# Patient Record
Sex: MALE | Race: WHITE | NOT HISPANIC OR LATINO | Employment: OTHER | URBAN - NONMETROPOLITAN AREA
[De-identification: names, ages, dates, MRNs, and addresses within clinical notes are randomized per-mention and may not be internally consistent; named-entity substitution may affect disease eponyms.]

---

## 2020-09-09 ENCOUNTER — CONSULT (OUTPATIENT)
Dept: CARDIOLOGY | Facility: CLINIC | Age: 55
End: 2020-09-09

## 2020-09-09 VITALS
TEMPERATURE: 97.8 F | SYSTOLIC BLOOD PRESSURE: 136 MMHG | HEART RATE: 86 BPM | OXYGEN SATURATION: 98 % | DIASTOLIC BLOOD PRESSURE: 88 MMHG | WEIGHT: 230 LBS | BODY MASS INDEX: 32.2 KG/M2 | HEIGHT: 71 IN

## 2020-09-09 DIAGNOSIS — Z79.4 TYPE 2 DIABETES MELLITUS WITHOUT COMPLICATION, WITH LONG-TERM CURRENT USE OF INSULIN (HCC): ICD-10-CM

## 2020-09-09 DIAGNOSIS — E11.9 TYPE 2 DIABETES MELLITUS WITHOUT COMPLICATION, WITH LONG-TERM CURRENT USE OF INSULIN (HCC): ICD-10-CM

## 2020-09-09 DIAGNOSIS — I10 ESSENTIAL HYPERTENSION: Primary | ICD-10-CM

## 2020-09-09 DIAGNOSIS — I25.119 CORONARY ARTERY DISEASE INVOLVING NATIVE CORONARY ARTERY OF NATIVE HEART WITH ANGINA PECTORIS (HCC): ICD-10-CM

## 2020-09-09 DIAGNOSIS — G47.33 OBSTRUCTIVE SLEEP APNEA: ICD-10-CM

## 2020-09-09 DIAGNOSIS — E78.5 HYPERLIPIDEMIA LDL GOAL <70: ICD-10-CM

## 2020-09-09 DIAGNOSIS — R09.89 BRUIT: ICD-10-CM

## 2020-09-09 DIAGNOSIS — I13.0 HYPERTENSIVE HEART AND CHRONIC KIDNEY DISEASE WITH HEART FAILURE AND STAGE 1 THROUGH STAGE 4 CHRONIC KIDNEY DISEASE, OR UNSPECIFIED CHRONIC KIDNEY DISEASE (HCC): ICD-10-CM

## 2020-09-09 PROBLEM — M06.9 RHEUMATOID ARTHRITIS (HCC): Status: ACTIVE | Noted: 2020-09-09

## 2020-09-09 PROBLEM — I20.9 ANGINA PECTORIS (HCC): Status: ACTIVE | Noted: 2020-09-09

## 2020-09-09 PROBLEM — J84.10 PULMONARY FIBROSIS: Status: ACTIVE | Noted: 2020-09-09

## 2020-09-09 PROCEDURE — 93000 ELECTROCARDIOGRAM COMPLETE: CPT | Performed by: INTERNAL MEDICINE

## 2020-09-09 PROCEDURE — 99204 OFFICE O/P NEW MOD 45 MIN: CPT | Performed by: INTERNAL MEDICINE

## 2020-09-09 RX ORDER — INSULIN DETEMIR 100 [IU]/ML
20 INJECTION, SOLUTION SUBCUTANEOUS DAILY
COMMUNITY
Start: 2020-08-25 | End: 2020-09-09 | Stop reason: SDUPTHER

## 2020-09-09 RX ORDER — FLUOXETINE HYDROCHLORIDE 20 MG/1
60 CAPSULE ORAL DAILY
COMMUNITY
Start: 2020-08-25 | End: 2022-10-26

## 2020-09-09 RX ORDER — GABAPENTIN 600 MG/1
600 TABLET ORAL AS NEEDED
COMMUNITY

## 2020-09-09 RX ORDER — HYDRALAZINE HYDROCHLORIDE 25 MG/1
25 TABLET, FILM COATED ORAL AS NEEDED
Start: 2020-09-09 | End: 2021-01-12

## 2020-09-09 RX ORDER — LISINOPRIL 40 MG/1
40 TABLET ORAL 2 TIMES DAILY
COMMUNITY
End: 2020-09-09 | Stop reason: SDUPTHER

## 2020-09-09 RX ORDER — HYDRALAZINE HYDROCHLORIDE 25 MG/1
25 TABLET, FILM COATED ORAL AS NEEDED
COMMUNITY
End: 2020-09-09 | Stop reason: SDUPTHER

## 2020-09-09 RX ORDER — FINASTERIDE 5 MG/1
5 TABLET, FILM COATED ORAL DAILY
COMMUNITY
Start: 2020-07-22

## 2020-09-09 RX ORDER — LORAZEPAM 0.5 MG/1
0.5 TABLET ORAL 2 TIMES DAILY
COMMUNITY

## 2020-09-09 RX ORDER — AMLODIPINE BESYLATE 10 MG/1
10 TABLET ORAL DAILY
Qty: 90 TABLET | Refills: 3 | Status: SHIPPED | OUTPATIENT
Start: 2020-09-09 | End: 2020-09-23

## 2020-09-09 RX ORDER — CLONIDINE HYDROCHLORIDE 0.2 MG/1
0.2 TABLET ORAL 3 TIMES DAILY
Start: 2020-09-09 | End: 2020-09-23

## 2020-09-09 RX ORDER — POTASSIUM CITRATE 5 MEQ/1
TABLET, EXTENDED RELEASE ORAL
COMMUNITY
End: 2022-10-26

## 2020-09-09 RX ORDER — PREDNISONE 20 MG/1
20 TABLET ORAL AS NEEDED
COMMUNITY

## 2020-09-09 RX ORDER — SPIRONOLACTONE 50 MG/1
50 TABLET, FILM COATED ORAL DAILY
Qty: 90 TABLET | Refills: 1 | Status: SHIPPED | OUTPATIENT
Start: 2020-09-09 | End: 2020-09-23 | Stop reason: SDUPTHER

## 2020-09-09 RX ORDER — LISINOPRIL 40 MG/1
40 TABLET ORAL DAILY
Qty: 90 TABLET | Refills: 1 | Status: SHIPPED | OUTPATIENT
Start: 2020-09-09 | End: 2020-09-23

## 2020-09-09 RX ORDER — CLONIDINE HYDROCHLORIDE 0.2 MG/1
0.2 TABLET ORAL 3 TIMES DAILY
COMMUNITY
End: 2020-09-09 | Stop reason: SDUPTHER

## 2020-09-09 RX ORDER — FENOFIBRATE 160 MG/1
160 TABLET ORAL DAILY
Start: 2020-09-09 | End: 2021-01-12 | Stop reason: SDUPTHER

## 2020-09-09 RX ORDER — INSULIN DETEMIR 100 [IU]/ML
20 INJECTION, SOLUTION SUBCUTANEOUS DAILY
Start: 2020-09-09 | End: 2020-09-23 | Stop reason: SDUPTHER

## 2020-09-09 RX ORDER — SULFAMETHOXAZOLE AND TRIMETHOPRIM 800; 160 MG/1; MG/1
TABLET ORAL DAILY
COMMUNITY
Start: 2020-07-27 | End: 2020-09-23

## 2020-09-09 RX ORDER — SPIRONOLACTONE 25 MG/1
25 TABLET ORAL DAILY
COMMUNITY
End: 2020-09-09 | Stop reason: SDUPTHER

## 2020-09-09 RX ORDER — ALBUTEROL SULFATE 2.5 MG/3ML
2.5 SOLUTION RESPIRATORY (INHALATION) EVERY 4 HOURS PRN
COMMUNITY

## 2020-09-09 RX ORDER — FENOFIBRATE 160 MG/1
160 TABLET ORAL DAILY
COMMUNITY
Start: 2020-08-25 | End: 2020-09-09 | Stop reason: SDUPTHER

## 2020-09-09 RX ORDER — TRAMADOL HYDROCHLORIDE 50 MG/1
25 TABLET ORAL AS NEEDED
COMMUNITY

## 2020-09-09 NOTE — PROGRESS NOTES
[facility]  Cardiology Consultation Note     DATE: 2020  Requesting Provider: ARLINE Cronin  PCP: Terrell Maldonado MD    IDENTIFICATION: Bret Rodriguez is a 54 y.o. male who resides in [place], [place].    REASON FOR CONSULTATION: Uncontrolled hypertension         Dear Jelly,    Thank you for referring Bret Rodriguez to my office for evaluation of uncontrolled hypertension, coronary artery disease, and CHF.  The patient reports a history of severe hypertension dating back to his early adulthood.  He has been maintained on multiple medications and still cannot control his blood pressure.  The patient also has a history of pulmonary fibrosis and rheumatoid arthritis.  He has been told his lung capacity is 50% and he believes that this contributes to some of his issues with high blood pressure.  He does have prednisone prescribed for his pulmonary fibrosis, but is reluctant to take it as he knows that this will worsen his blood pressure.     The patient has been followed over the last several years by Dr. Beebe at [facility].  We do not have these records.  However, the patient states that he has had several heart catheterizations with the most recent catheterization in approximately .  At that time he was told he had moderate disease.  He does not recall any evaluation for renal artery stenosis.    The patient reports an episode of chest pain approximately 2 to 3 weeks ago which was not related to exertion.  He recalls the pain being severe and doubled him over.  He reports no radiation to the jaw, neck, or arm.  He did remember being diaphoretic, however.  The pain resolved spontaneously after several minutes and has not returned.    The patient experiences shortness of breath almost constantly due to pulmonary fibrosis.    The patient has family history of premature coronary artery disease.  His brother  of a heart attack in his mid 40s.    Past Medical  History, Past Surgical History, Family history, Social History, and Medications were all reviewed with the patient today and updated as necessary.       Current Outpatient Medications:   •  albuterol (PROVENTIL) (2.5 MG/3ML) 0.083% nebulizer solution, Take 2.5 mg by nebulization Every 4 (Four) Hours As Needed for Wheezing., Disp: , Rfl:   •  cloNIDine (CATAPRES) 0.2 MG tablet, Take 1 tablet by mouth 3 (Three) Times a Day., Disp: , Rfl:   •  fenofibrate 160 MG tablet, Take 160 mg by mouth Daily., Disp: , Rfl:   •  finasteride (PROSCAR) 5 MG tablet, Take 5 mg by mouth Daily., Disp: , Rfl:   •  FLUoxetine (PROzac) 20 MG capsule, 60 mg Daily., Disp: , Rfl:   •  gabapentin (NEURONTIN) 600 MG tablet, Take 600 mg by mouth As Needed., Disp: , Rfl:   •  hydrALAZINE (APRESOLINE) 25 MG tablet, Take 1 tablet by mouth As Needed (SBP > 180 mmHg)., Disp: , Rfl:   •  LEVEMIR FLEXTOUCH 100 UNIT/ML injection, Inject 20 Units under the skin into the appropriate area as directed Daily., Disp: , Rfl:   •  lisinopril (PRINIVIL,ZESTRIL) 40 MG tablet, Take 1 tablet by mouth Daily., Disp: 90 tablet, Rfl: 1  •  LORazepam (ATIVAN) 0.5 MG tablet, Take 0.5 mg by mouth 2 (Two) Times a Day., Disp: , Rfl:   •  metFORMIN (GLUCOPHAGE) 1000 MG tablet, Take 1 tablet by mouth 2 (Two) Times a Day With Meals., Disp: , Rfl:   •  NAPROXEN PO, Take  by mouth As Needed., Disp: , Rfl:   •  potassium citrate (UROCIT-K) 5 MEQ (540 MG) CR tablet, Take  by mouth 3 (Three) Times a Day With Meals., Disp: , Rfl:   •  predniSONE (DELTASONE) 20 MG tablet, Take 20 mg by mouth As Needed., Disp: , Rfl:   •  spironolactone (ALDACTONE) 50 MG tablet, Take 1 tablet by mouth Daily., Disp: 90 tablet, Rfl: 1  •  sulfamethoxazole-trimethoprim (BACTRIM DS,SEPTRA DS) 800-160 MG per tablet, Daily., Disp: , Rfl:   •  traMADol (ULTRAM) 50 MG tablet, Take 25 mg by mouth As Needed for Moderate Pain ., Disp: , Rfl:   •  amLODIPine (NORVASC) 10 MG tablet, Take 1 tablet by mouth Daily.,  "Disp: 90 tablet, Rfl: 3  •  Empagliflozin (Jardiance) 10 MG tablet, Take 10 mg by mouth Daily., Disp: 90 tablet, Rfl: 1    Allergies   Allergen Reactions   • Atorvastatin Myalgia         Past Medical History:   Diagnosis Date   • Anxiety    • Arthritis    • Congestive heart failure (CHF) (CMS/HCC)    • Depression    • Easy bruising    • Heart failure (CMS/HCC)    • History of right heart catheterization    • History of stress test    • Hypertension    • Kidney stones    • Pulmonary fibrosis (CMS/HCC)        Past Surgical History:   Procedure Laterality Date   • GALLBLADDER SURGERY     • LUNG BIOPSY     • TONSILLECTOMY     • TONSILLECTOMY AND ADENOIDECTOMY         Family History   Problem Relation Age of Onset   • Hypertension Mother    • Cancer Mother    • Parkinsonism Father    • Coronary artery disease Father    • Hypertension Sister    • Emphysema Sister    • Heart attack Brother 46   • Diabetes Brother        Social History     Tobacco Use   • Smoking status: Never Smoker   • Smokeless tobacco: Never Used   Substance Use Topics   • Alcohol use: Never     Frequency: Never       Review of Systems   Constitution: Negative for malaise/fatigue.   Eyes: Negative for vision loss in left eye and vision loss in right eye.   Cardiovascular: Positive for chest pain, dyspnea on exertion and leg swelling. Negative for near-syncope, orthopnea, palpitations, paroxysmal nocturnal dyspnea and syncope.   Respiratory: Positive for shortness of breath.    Musculoskeletal: Negative for myalgias.   Neurological: Negative for brief paralysis, excessive daytime sleepiness, focal weakness, numbness, paresthesias and weakness.   All other systems reviewed and are negative.              /88 (BP Location: Right arm, Patient Position: Sitting)   Pulse 86   Temp 97.8 °F (36.6 °C)   Ht 180.3 cm (71\")   Wt 104 kg (230 lb)   SpO2 98%   BMI 32.08 kg/m²        Physical Exam   Constitutional: He is oriented to person, place, and time. " He appears well-developed and well-nourished.   HENT:   Head: Normocephalic and atraumatic.   Eyes: Conjunctivae are normal. No scleral icterus.   Neck: Normal range of motion. No JVD present. Carotid bruit is not present. No thyromegaly present.   Cardiovascular: Normal rate and regular rhythm. Exam reveals no gallop.   No murmur heard.  Pulses:       Carotid pulses are on the right side with bruit, and on the left side with bruit.  Pulmonary/Chest: Effort normal and breath sounds normal.   Abdominal: Soft. He exhibits no distension and no mass. There is no hepatosplenomegaly.   Musculoskeletal: He exhibits no edema.   Neurological: He is alert and oriented to person, place, and time.   Skin: Skin is warm and dry. No rash noted.   Psychiatric: He has a normal mood and affect. His behavior is normal.           ECG 12 Lead  Date/Time: 9/9/2020 9:28 AM  Performed by: Casey Milton IV, MD  Authorized by: Casey Milton IV, MD   Previous ECG: no previous ECG available  Rhythm: sinus rhythm  BPM: 72    Clinical impression: normal ECG          Labs (07/27/2020):  · Creat 1.37, BUN 15, K 3.9, AST 18, ALT 22  · WBC 5.3, RBC 4.62, HGB 14.3, HCT 42.6,   · T4, Free 1.09, TSH 1.68    (02/05/2020):  · Chol 190, Trig 607, HDL 34           Problem List Items Addressed This Visit        Cardiology Problems    Coronary artery disease involving native coronary artery of native heart with angina pectoris (CMS/[facility]) - Primary    Overview     · Cardiac catheterization by [facility] (2015):  Moderate disease.         Current Assessment & Plan     · Atypical anginal symptoms  · Multiple cardiovascular risk factors and history of moderate disease on previous catheterization  · Recommend pharmacologic nuclear stress test  · Continue low-dose aspirin  · Will discuss statin therapy at next visit         Relevant Medications    amLODIPine (NORVASC) 10 MG tablet    Other Relevant Orders    ECG 12 Lead    CBC (No Diff)     Stress Test With Myocardial Perfusion (1 Day)    Essential hypertension    Overview     Severe hypertension since early adulthood  • Target blood pressure <130/80 mmHg         Current Assessment & Plan     · Uncontrolled  · Evaluate for secondary causes of hypertension including plasma renin and aldosterone, renal artery duplex  · Reduce lisinopril dosing to 40 mg daily  · Increase spironolactone to 50 mg daily  · CMP in 2 weeks  · Continue to use hydralazine on an as-needed basis  · Consider doxazosin/terazosin if BP remains elevated after above changes         Relevant Medications    amLODIPine (NORVASC) 10 MG tablet    cloNIDine (CATAPRES) 0.2 MG tablet    hydrALAZINE (APRESOLINE) 25 MG tablet    lisinopril (PRINIVIL,ZESTRIL) 40 MG tablet    spironolactone (ALDACTONE) 50 MG tablet    Other Relevant Orders    Renin Activity & Aldosterone    Duplex Renal Artery - Bilateral Complete CAR    Adult Transthoracic Echo Complete W/ Cont if Necessary Per Protocol    Hyperlipidemia LDL goal <70    Overview     • High intensity statin therapy indicated given the presence of CAD  · Intolerant to atorvastatin due to myalgias         Current Assessment & Plan     · Will recommend rosuvastatin 10 mg daily at upcoming visit         Relevant Medications    fenofibrate 160 MG tablet    Other Relevant Orders    Lipid Panel    LDL Cholesterol, Direct    Comprehensive Metabolic Panel       Other    Type 2 diabetes mellitus (CMS/[facility])    Current Assessment & Plan     · ACE inhibitor and statin therapy indicated diabetic status  · Trial of Jardiance 10 mg daily for diabetes and CV risk reduction         Relevant Medications    LEVEMIR FLEXTOUCH 100 UNIT/ML injection    metFORMIN (GLUCOPHAGE) 1000 MG tablet    Empagliflozin (Jardiance) 10 MG tablet    Other Relevant Orders    Hemoglobin A1c    Obstructive sleep apnea    Current Assessment & Plan     · Continue CPAP           Other Visit Diagnoses     Bruit        Relevant Orders    Duplex  Carotid Ultrasound CAR    Hypertensive heart and chronic kidney disease with heart failure and stage 1 through stage 4 chronic kidney disease, or unspecified chronic kidney disease (CMS/HCC)         Relevant Medications    amLODIPine (NORVASC) 10 MG tablet    spironolactone (ALDACTONE) 50 MG tablet    Other Relevant Orders    Adult Transthoracic Echo Complete W/ Cont if Necessary Per Protocol                   · Exercise nuclear stress test  · Echocardiogram  · Carotid duplex  · Renal artery duplex  · Reduce lisinopril dosing to 40 mg daily  · Increase spironolactone to 50 mg daily  · Trial of Jardiance 10 mg daily for diabetes and CV risk reduction  · Blood work in 2 weeks to include CMP, lipids, direct LDL, renin activity and aldosterone concentration, CBC, hemoglobin A1c  · Obtain records from Dr. Beebe including office visit notes, cardiac catheterization report  Return in about 2 weeks (around 9/23/2020).          KASSANDRA Milton MD [facility], [facility]  Interventional and General Cardiology    09/09/20  11:38

## 2020-09-09 NOTE — ASSESSMENT & PLAN NOTE
· ACE inhibitor and statin therapy indicated diabetic status  · Trial of Jardiance 10 mg daily for diabetes and CV risk reduction

## 2020-09-09 NOTE — ASSESSMENT & PLAN NOTE
· Atypical anginal symptoms  · Multiple cardiovascular risk factors and history of moderate disease on previous catheterization  · Recommend pharmacologic nuclear stress test  · Continue low-dose aspirin  · Will discuss statin therapy at next visit

## 2020-09-09 NOTE — ASSESSMENT & PLAN NOTE
· Uncontrolled  · Evaluate for secondary causes of hypertension including plasma renin and aldosterone, renal artery duplex  · Reduce lisinopril dosing to 40 mg daily  · Increase spironolactone to 50 mg daily  · CMP in 2 weeks  · Continue to use hydralazine on an as-needed basis  · Consider doxazosin/terazosin if BP remains elevated after above changes

## 2020-09-18 ENCOUNTER — OUTSIDE FACILITY SERVICE (OUTPATIENT)
Dept: CARDIOLOGY | Facility: CLINIC | Age: 55
End: 2020-09-18

## 2020-09-18 PROCEDURE — 78452 HT MUSCLE IMAGE SPECT MULT: CPT | Performed by: INTERNAL MEDICINE

## 2020-09-18 PROCEDURE — 93018 CV STRESS TEST I&R ONLY: CPT | Performed by: INTERNAL MEDICINE

## 2020-09-22 PROBLEM — N28.9 RENAL INSUFFICIENCY: Status: ACTIVE | Noted: 2020-09-22

## 2020-09-23 ENCOUNTER — OFFICE VISIT (OUTPATIENT)
Dept: CARDIOLOGY | Facility: CLINIC | Age: 55
End: 2020-09-23

## 2020-09-23 ENCOUNTER — PREP FOR SURGERY (OUTPATIENT)
Dept: OTHER | Facility: HOSPITAL | Age: 55
End: 2020-09-23

## 2020-09-23 VITALS
TEMPERATURE: 97.8 F | OXYGEN SATURATION: 98 % | HEIGHT: 71 IN | WEIGHT: 223 LBS | BODY MASS INDEX: 31.22 KG/M2 | DIASTOLIC BLOOD PRESSURE: 80 MMHG | SYSTOLIC BLOOD PRESSURE: 138 MMHG | HEART RATE: 82 BPM

## 2020-09-23 DIAGNOSIS — Z79.4 TYPE 2 DIABETES MELLITUS WITHOUT COMPLICATION, WITH LONG-TERM CURRENT USE OF INSULIN (HCC): ICD-10-CM

## 2020-09-23 DIAGNOSIS — I10 ESSENTIAL HYPERTENSION: ICD-10-CM

## 2020-09-23 DIAGNOSIS — R55 SYNCOPE, UNSPECIFIED SYNCOPE TYPE: ICD-10-CM

## 2020-09-23 DIAGNOSIS — I25.119 CORONARY ARTERY DISEASE INVOLVING NATIVE CORONARY ARTERY OF NATIVE HEART WITH ANGINA PECTORIS (HCC): Primary | ICD-10-CM

## 2020-09-23 DIAGNOSIS — Z79.2 PROPHYLACTIC ANTIBIOTIC: ICD-10-CM

## 2020-09-23 DIAGNOSIS — E11.9 TYPE 2 DIABETES MELLITUS WITHOUT COMPLICATION, WITH LONG-TERM CURRENT USE OF INSULIN (HCC): ICD-10-CM

## 2020-09-23 DIAGNOSIS — E78.5 HYPERLIPIDEMIA LDL GOAL <70: ICD-10-CM

## 2020-09-23 DIAGNOSIS — I47.20 VENTRICULAR TACHYCARDIA (HCC): ICD-10-CM

## 2020-09-23 PROCEDURE — 99214 OFFICE O/P EST MOD 30 MIN: CPT | Performed by: INTERNAL MEDICINE

## 2020-09-23 RX ORDER — ROSUVASTATIN CALCIUM 10 MG/1
10 TABLET, COATED ORAL NIGHTLY
Start: 2020-09-23 | End: 2021-01-12 | Stop reason: SDUPTHER

## 2020-09-23 RX ORDER — TAMSULOSIN HYDROCHLORIDE 0.4 MG/1
1 CAPSULE ORAL DAILY
COMMUNITY

## 2020-09-23 RX ORDER — ASPIRIN 81 MG/1
81 TABLET ORAL DAILY
Status: CANCELLED | OUTPATIENT
Start: 2020-09-24

## 2020-09-23 RX ORDER — SODIUM CHLORIDE 0.9 % (FLUSH) 0.9 %
10 SYRINGE (ML) INJECTION AS NEEDED
Status: CANCELLED | OUTPATIENT
Start: 2020-09-23

## 2020-09-23 RX ORDER — METOPROLOL TARTRATE 50 MG/1
50 TABLET, FILM COATED ORAL 2 TIMES DAILY
Qty: 180 TABLET | Refills: 3 | Status: SHIPPED | OUTPATIENT
Start: 2020-09-23 | End: 2021-01-12 | Stop reason: SDUPTHER

## 2020-09-23 RX ORDER — SPIRONOLACTONE 50 MG/1
50 TABLET, FILM COATED ORAL DAILY
Qty: 90 TABLET | Refills: 1 | Status: SHIPPED | OUTPATIENT
Start: 2020-09-23 | End: 2021-01-12 | Stop reason: SDUPTHER

## 2020-09-23 RX ORDER — ASPIRIN 81 MG/1
324 TABLET, CHEWABLE ORAL ONCE
Status: CANCELLED | OUTPATIENT
Start: 2020-09-23 | End: 2020-09-23

## 2020-09-23 RX ORDER — NITROGLYCERIN 0.4 MG/1
0.4 TABLET SUBLINGUAL AS NEEDED
COMMUNITY
End: 2020-09-23 | Stop reason: SDUPTHER

## 2020-09-23 RX ORDER — METOPROLOL TARTRATE 50 MG/1
50 TABLET, FILM COATED ORAL 2 TIMES DAILY
COMMUNITY
Start: 2020-09-21 | End: 2020-09-23 | Stop reason: SDUPTHER

## 2020-09-23 RX ORDER — ROSUVASTATIN CALCIUM 10 MG/1
TABLET, COATED ORAL DAILY
COMMUNITY
Start: 2020-09-21 | End: 2020-09-23 | Stop reason: SDUPTHER

## 2020-09-23 RX ORDER — ASPIRIN 81 MG/1
81 TABLET ORAL DAILY
Start: 2020-09-23 | End: 2021-01-12 | Stop reason: SDUPTHER

## 2020-09-23 RX ORDER — INSULIN DETEMIR 100 [IU]/ML
20 INJECTION, SOLUTION SUBCUTANEOUS DAILY
Start: 2020-09-23

## 2020-09-23 RX ORDER — SODIUM CHLORIDE 0.9 % (FLUSH) 0.9 %
3 SYRINGE (ML) INJECTION EVERY 12 HOURS SCHEDULED
Status: CANCELLED | OUTPATIENT
Start: 2020-09-23

## 2020-09-23 RX ORDER — ALPRAZOLAM 0.5 MG/1
0.5 TABLET ORAL ONCE
Status: CANCELLED | OUTPATIENT
Start: 2020-09-23 | End: 2020-09-23

## 2020-09-23 RX ORDER — CEFAZOLIN SODIUM 1 G/3ML
2 INJECTION, POWDER, FOR SOLUTION INTRAMUSCULAR; INTRAVENOUS
Status: CANCELLED | OUTPATIENT
Start: 2020-09-24 | End: 2020-09-24

## 2020-09-23 RX ORDER — ASPIRIN 81 MG/1
81 TABLET ORAL DAILY
COMMUNITY
End: 2020-09-23 | Stop reason: SDUPTHER

## 2020-09-23 RX ORDER — NITROGLYCERIN 0.4 MG/1
0.4 TABLET SUBLINGUAL
Qty: 25 TABLET | Refills: 5 | Status: SHIPPED | OUTPATIENT
Start: 2020-09-23

## 2020-09-23 NOTE — ASSESSMENT & PLAN NOTE
· Chest pain associated with syncope and tachypalpitations  · Stress test suggest inferior infarct or diaphragmatic attenuation  · Given patient's unexplained syncope and nonsustained VT on telemetry, I am recommending proceeding with cardiac catheterization to exclude obstructive CAD pending acceptable renal function

## 2020-09-23 NOTE — ASSESSMENT & PLAN NOTE
· Multiple episodes of unexplained syncope associated with rapid palpitations symptoms concerning for cardiac etiology  · Recommend loop recorder implantation

## 2020-09-23 NOTE — PROGRESS NOTES
[facility] at [facility]  Office Visit Note    DATE: 09/23/2020    IDENTIFICATION: Bret Rodriguez is a 54 y.o. male who resides in [place], [place].    REASON FOR VISIT:  • Hospital follow-up [facility], 9/2020  • Coronary artery disease  • Hypertension  • Hyperlipidemia  • Diabetes, type 2            Bret Rodriguez returns to my office sooner than expected after being admitted to [facility] for rapid palpitations, chest pain, syncope, and acute renal injury.  The patient was admitted on 9/18/2020 after presenting to his primary care office with fatigue and low blood pressures (typically uncontrolled blood pressure) as well as reported heart rates of 160-180 bpm.  The PCP advised him to go to the hospital, but before the patient left the office, he had a syncopal episode and was transferred via EMS.    On admission, he was found to have acute renal failure with a creatinine of 3.4.  He underwent an echocardiogram which was unremarkable.  He also had some mild troponin elevation and had complaints of chest pain.  The patient underwent nuclear stress testing which showed a inferior wall defect consistent with either infarct or diaphragmatic attenuation.  He had a 10 beat run of nonsustained VT on telemetry for which he reportedly had no symptoms.  Medications were adjusted, including discontinuation of amlodipine and lisinopril, and the patient was ultimately discharged with a creatinine of 1.8.  The patient was told he was dehydrated, although he states that he typically drinks plenty of water and does not drink soda or other caffeinated beverages.  He denied any excessive diarrhea or vomiting at that time.    The patient has had a longstanding history of syncope with previous neurologic work-up and benign Holter monitors.  The patient's wife states that he has passed out numerous times in the last several years.  The patient states that these  episodes typically include symptoms of rapid heart rate, but no arrhythmias ever been detected      Review of Systems   Constitution: Negative for malaise/fatigue.   Eyes: Negative for vision loss in left eye and vision loss in right eye.   Cardiovascular: Positive for chest pain, dyspnea on exertion, palpitations and syncope. Negative for near-syncope, orthopnea and paroxysmal nocturnal dyspnea.   Respiratory: Positive for shortness of breath.    Musculoskeletal: Negative for myalgias.   Neurological: Negative for brief paralysis, excessive daytime sleepiness, focal weakness, numbness, paresthesias and weakness.   All other systems reviewed and are negative.      The patient's past medical, social, family history and ROS reviewed in the patient's electronic medical record.    Allergies   Allergen Reactions   • Atorvastatin Myalgia         Current Outpatient Medications:   •  albuterol (PROVENTIL) (2.5 MG/3ML) 0.083% nebulizer solution, Take 2.5 mg by nebulization Every 4 (Four) Hours As Needed for Wheezing., Disp: , Rfl:   •  aspirin 81 MG EC tablet, Take 1 tablet by mouth Daily., Disp: , Rfl:   •  Empagliflozin (Jardiance) 10 MG tablet, Take 10 mg by mouth Daily., Disp: 90 tablet, Rfl: 1  •  fenofibrate 160 MG tablet, Take 1 tablet by mouth Daily., Disp: , Rfl:   •  finasteride (PROSCAR) 5 MG tablet, Take 5 mg by mouth Daily., Disp: , Rfl:   •  FLUoxetine (PROzac) 20 MG capsule, 60 mg Daily., Disp: , Rfl:   •  gabapentin (NEURONTIN) 600 MG tablet, Take 600 mg by mouth As Needed., Disp: , Rfl:   •  hydrALAZINE (APRESOLINE) 25 MG tablet, Take 1 tablet by mouth As Needed (SBP > 180 mmHg)., Disp: , Rfl:   •  insulin detemir (Levemir FlexTouch) 100 UNIT/ML injection, Inject 20 Units under the skin into the appropriate area as directed Daily., Disp: , Rfl:   •  LORazepam (ATIVAN) 0.5 MG tablet, Take 0.5 mg by mouth 2 (Two) Times a Day., Disp: , Rfl:   •  metoprolol tartrate (LOPRESSOR) 50 MG tablet, Take 1 tablet by  "mouth 2 (Two) Times a Day., Disp: 180 tablet, Rfl: 3  •  nitroglycerin (NITROSTAT) 0.4 MG SL tablet, Place 1 tablet under the tongue Every 5 (Five) Minutes As Needed for Chest Pain., Disp: 25 tablet, Rfl: 5  •  potassium citrate (UROCIT-K) 5 MEQ (540 MG) CR tablet, Take  by mouth 3 (Three) Times a Day With Meals., Disp: , Rfl:   •  predniSONE (DELTASONE) 20 MG tablet, Take 20 mg by mouth As Needed., Disp: , Rfl:   •  rosuvastatin (CRESTOR) 10 MG tablet, Take 1 tablet by mouth Every Night., Disp: , Rfl:   •  spironolactone (ALDACTONE) 50 MG tablet, Take 1 tablet by mouth Daily., Disp: 90 tablet, Rfl: 1  •  tamsulosin (FLOMAX) 0.4 MG capsule 24 hr capsule, Take 1 capsule by mouth Daily., Disp: , Rfl:   •  traMADol (ULTRAM) 50 MG tablet, Take 25 mg by mouth As Needed for Moderate Pain ., Disp: , Rfl:     Past Medical History:   Diagnosis Date   • Anxiety    • Arthritis    • Congestive heart failure (CHF) (CMS/HCC)    • Depression    • Easy bruising    • Hypertension    • Kidney stones    • Pulmonary fibrosis (CMS/HCC)    • Syncope        Past Surgical History:   Procedure Laterality Date   • GALLBLADDER SURGERY     • LUNG BIOPSY     • TONSILLECTOMY     • TONSILLECTOMY AND ADENOIDECTOMY         Family History   Problem Relation Age of Onset   • Hypertension Mother    • Cancer Mother    • Parkinsonism Father    • Coronary artery disease Father    • Hypertension Sister    • Emphysema Sister    • Heart attack Brother 46   • Diabetes Brother        Social History     Tobacco Use   • Smoking status: Never Smoker   • Smokeless tobacco: Never Used   Substance Use Topics   • Alcohol use: Never     Frequency: Never           Blood pressure 138/80, pulse 82, temperature 97.8 °F (36.6 °C), height 180.3 cm (71\"), weight 101 kg (223 lb), SpO2 98 %.  Body mass index is 31.1 kg/m².  Vitals:    09/23/20 1421   Patient Position: Sitting       Constitutional:       Appearance: Well-developed.   Eyes:      General: No scleral icterus.     " Pupils: Pupils are equal, round, and reactive to light.   HENT:      Head: Normocephalic and atraumatic.   Neck:      Thyroid: No thyromegaly.      Vascular: No carotid bruit or JVD.   Pulmonary:      Effort: Pulmonary effort is normal.      Breath sounds: Normal breath sounds.   Cardiovascular:      Normal rate. Regular rhythm.      Murmurs: There is no murmur.      No gallop.   Abdominal:      Palpations: Abdomen is soft. There is no abdominal mass.      Tenderness: There is no abdominal tenderness.   Musculoskeletal:      Extremities: No clubbing present.  Skin:     General: Skin is warm and dry. There is no cyanosis.   Neurological:      Mental Status: Alert and oriented to person, place, and time.   Psychiatric:         Behavior: Behavior normal.         Data Review (reviewed with patient):     Records from [facility] from last week reviewed:    Telemetry strip from 9/19/2020 shows 10 beat run of nonsustained VT    Pharmacologic nuclear stress performed 9/18/2020 shows LVEF 45%.  Inferior wall defect most likely diaphragmatic attenuation and less likely inferior wall infarct    Echocardiogram performed 9/18/2020 demonstrated LVEF 60%, normal right ventricular size.  No significant valvular abnormality    Procedures     Labs (09/17/2020):  · Na 138, K 4.1, Creat 3.4, BUN 33, AST 19, ALT 38  · Troponin 0.031, 0.028, 0.015  · WBC 12.96, RBC 5.45, HBG 17.5, HCT 48.6,          Problem List Items Addressed This Visit        Cardiology Problems    Syncope    Overview     · 10-beat NSVT on tele at [facility]  · History of unexplained syncope with rapid palpitations concerning for arrhythmia   · Echocardiogram (09/18/2020): EF 60%.  No significant valve abnormality         Current Assessment & Plan     · Multiple episodes of unexplained syncope associated with rapid palpitations symptoms concerning for cardiac etiology  · Recommend loop recorder implantation         Coronary  artery disease involving native coronary artery of native heart with angina pectoris (CMS/HCC) - Primary    Overview     · Cardiac catherization (07/29/2009): Mild CAD. Preserved LVEF.   · Echocardiogram at [facility] (03/20/2014): LVEF 50-55%. Mild aortic valve sclerosis without stenosis.   · Cardiac catheterization by [facility] (2015):  Moderate disease.  · Cardiac catherization (11/09/2017): Noncritical CAD. Patient inferior wall fixed defect could be due to small vessel disease.   · CXR (09/17/2020): No acute disease.   · Echocardiogram (09/14/2017): EF 55-60%.  No significant valvular abnormality  · Nuclear stress test (09/18/2020): Fixed inferior wall defect probably diaphragmatic attenuation or inferior infarct. No ischemia seen. LVEF 45%.  · Echocardiogram (09/18/2020): EF 60%.  No significant valve abnormality         Current Assessment & Plan     · Chest pain associated with syncope and tachypalpitations  · Stress test suggest inferior infarct or diaphragmatic attenuation  · Given patient's unexplained syncope and nonsustained VT on telemetry, I am recommending proceeding with cardiac catheterization to exclude obstructive CAD pending acceptable renal function         Relevant Medications    metoprolol tartrate (LOPRESSOR) 50 MG tablet    aspirin 81 MG EC tablet    nitroglycerin (NITROSTAT) 0.4 MG SL tablet    Other Relevant Orders    Case Request Cath Lab: Left Heart Cath, Loop insertion    Essential hypertension    Overview     · Severe hypertension since early adulthood  • Target blood pressure <130/80 mmHg  • Renal duplex (9/18/2020): No evidence of renal artery stenosis         Current Assessment & Plan     · Recent hospitalization for hypotension, syncope, and acute renal failure  · Continue to hold lisinopril due to recent kidney injury  · Reassess at the time of cardiac catheterization         Relevant Medications    metoprolol tartrate (LOPRESSOR) 50 MG tablet    spironolactone  (ALDACTONE) 50 MG tablet    Hyperlipidemia LDL goal <70    Overview     • High intensity statin therapy indicated given the presence of CAD  · Intolerant to atorvastatin due to myalgias         Current Assessment & Plan     · Continue rosuvastatin         Relevant Medications    rosuvastatin (CRESTOR) 10 MG tablet       Other    Type 2 diabetes mellitus (CMS/[facility])    Current Assessment & Plan     · Obtain hemoglobin A1c at the time of catheterization         Relevant Medications    insulin detemir (Levemir FlexTouch) 100 UNIT/ML injection               · Loop recorder implant and left heart catheterization next Monday, 9/28/2020  · Obtain labs day of procedure including CMP, CBC, lipids, hemoglobin A1c  · Further recommendations to follow      Casey Milton IV MD, FACC, [facility]  9/23/2020

## 2020-09-23 NOTE — ASSESSMENT & PLAN NOTE
· Recent hospitalization for hypotension, syncope, and acute renal failure  · Continue to hold lisinopril due to recent kidney injury  · Reassess at the time of cardiac catheterization

## 2020-09-24 ENCOUNTER — TRANSCRIBE ORDERS (OUTPATIENT)
Dept: ADMINISTRATIVE | Facility: HOSPITAL | Age: 55
End: 2020-09-24

## 2020-09-24 DIAGNOSIS — Z01.818 PRE-OPERATIVE CLEARANCE: Primary | ICD-10-CM

## 2020-09-24 PROBLEM — I47.20 VENTRICULAR TACHYCARDIA: Status: ACTIVE | Noted: 2020-09-24

## 2020-09-25 ENCOUNTER — PREP FOR SURGERY (OUTPATIENT)
Dept: OTHER | Facility: HOSPITAL | Age: 55
End: 2020-09-25

## 2020-09-25 ENCOUNTER — LAB (OUTPATIENT)
Dept: LAB | Facility: HOSPITAL | Age: 55
End: 2020-09-25

## 2020-09-25 DIAGNOSIS — Z01.818 PRE-OPERATIVE CLEARANCE: ICD-10-CM

## 2020-09-25 PROCEDURE — C9803 HOPD COVID-19 SPEC COLLECT: HCPCS

## 2020-09-25 PROCEDURE — U0004 COV-19 TEST NON-CDC HGH THRU: HCPCS

## 2020-09-26 LAB — SARS-COV-2 RNA NOSE QL NAA+PROBE: NOT DETECTED

## 2020-09-28 ENCOUNTER — HOSPITAL ENCOUNTER (OUTPATIENT)
Facility: HOSPITAL | Age: 55
Setting detail: HOSPITAL OUTPATIENT SURGERY
Discharge: HOME | End: 2020-09-28
Attending: INTERNAL MEDICINE | Admitting: INTERNAL MEDICINE

## 2020-09-28 VITALS
HEART RATE: 80 BPM | RESPIRATION RATE: 16 BRPM | SYSTOLIC BLOOD PRESSURE: 126 MMHG | BODY MASS INDEX: 30.56 KG/M2 | TEMPERATURE: 97.6 F | HEIGHT: 71 IN | WEIGHT: 218.26 LBS | DIASTOLIC BLOOD PRESSURE: 91 MMHG | OXYGEN SATURATION: 97 %

## 2020-09-28 DIAGNOSIS — R55 SYNCOPE, UNSPECIFIED SYNCOPE TYPE: ICD-10-CM

## 2020-09-28 DIAGNOSIS — E78.5 HYPERLIPIDEMIA LDL GOAL <70: ICD-10-CM

## 2020-09-28 DIAGNOSIS — I47.20 VENTRICULAR TACHYCARDIA (HCC): ICD-10-CM

## 2020-09-28 DIAGNOSIS — E11.9 TYPE 2 DIABETES MELLITUS WITHOUT COMPLICATION, WITH LONG-TERM CURRENT USE OF INSULIN (HCC): ICD-10-CM

## 2020-09-28 DIAGNOSIS — I25.119 CORONARY ARTERY DISEASE INVOLVING NATIVE CORONARY ARTERY OF NATIVE HEART WITH ANGINA PECTORIS (HCC): ICD-10-CM

## 2020-09-28 DIAGNOSIS — Z79.4 TYPE 2 DIABETES MELLITUS WITHOUT COMPLICATION, WITH LONG-TERM CURRENT USE OF INSULIN (HCC): ICD-10-CM

## 2020-09-28 DIAGNOSIS — Z79.2 PROPHYLACTIC ANTIBIOTIC: ICD-10-CM

## 2020-09-28 LAB
ALBUMIN SERPL-MCNC: 4.8 G/DL (ref 3.5–5.2)
ALBUMIN/GLOB SERPL: 1.7 G/DL
ALP SERPL-CCNC: 48 U/L (ref 39–117)
ALT SERPL W P-5'-P-CCNC: 27 U/L (ref 1–41)
ANION GAP SERPL CALCULATED.3IONS-SCNC: 12 MMOL/L (ref 5–15)
AST SERPL-CCNC: 25 U/L (ref 1–40)
BASOPHILS # BLD AUTO: 0.05 10*3/MM3 (ref 0–0.2)
BASOPHILS NFR BLD AUTO: 0.6 % (ref 0–1.5)
BILIRUB SERPL-MCNC: 0.6 MG/DL (ref 0–1.2)
BUN SERPL-MCNC: 26 MG/DL (ref 6–20)
BUN/CREAT SERPL: 16.6 (ref 7–25)
CALCIUM SPEC-SCNC: 10.4 MG/DL (ref 8.6–10.5)
CHLORIDE SERPL-SCNC: 100 MMOL/L (ref 98–107)
CHOLEST SERPL-MCNC: 137 MG/DL (ref 0–200)
CO2 SERPL-SCNC: 25 MMOL/L (ref 22–29)
CREAT SERPL-MCNC: 1.57 MG/DL (ref 0.76–1.27)
DEPRECATED RDW RBC AUTO: 42.9 FL (ref 37–54)
EOSINOPHIL # BLD AUTO: 0.11 10*3/MM3 (ref 0–0.4)
EOSINOPHIL NFR BLD AUTO: 1.3 % (ref 0.3–6.2)
ERYTHROCYTE [DISTWIDTH] IN BLOOD BY AUTOMATED COUNT: 13.2 % (ref 12.3–15.4)
GFR SERPL CREATININE-BSD FRML MDRD: 46 ML/MIN/1.73
GLOBULIN UR ELPH-MCNC: 2.9 GM/DL
GLUCOSE BLDC GLUCOMTR-MCNC: 143 MG/DL (ref 70–130)
GLUCOSE SERPL-MCNC: 165 MG/DL (ref 65–99)
HBA1C MFR BLD: 6.4 % (ref 4.8–5.6)
HCT VFR BLD AUTO: 48 % (ref 37.5–51)
HDLC SERPL-MCNC: 38 MG/DL (ref 40–60)
HGB BLD-MCNC: 16.3 G/DL (ref 13–17.7)
IMM GRANULOCYTES # BLD AUTO: 0.04 10*3/MM3 (ref 0–0.05)
IMM GRANULOCYTES NFR BLD AUTO: 0.5 % (ref 0–0.5)
LDLC SERPL CALC-MCNC: 65 MG/DL (ref 0–100)
LDLC/HDLC SERPL: 1.71 {RATIO}
LYMPHOCYTES # BLD AUTO: 2.46 10*3/MM3 (ref 0.7–3.1)
LYMPHOCYTES NFR BLD AUTO: 29.9 % (ref 19.6–45.3)
MCH RBC QN AUTO: 30.6 PG (ref 26.6–33)
MCHC RBC AUTO-ENTMCNC: 34 G/DL (ref 31.5–35.7)
MCV RBC AUTO: 90.1 FL (ref 79–97)
MONOCYTES # BLD AUTO: 0.75 10*3/MM3 (ref 0.1–0.9)
MONOCYTES NFR BLD AUTO: 9.1 % (ref 5–12)
NEUTROPHILS NFR BLD AUTO: 4.83 10*3/MM3 (ref 1.7–7)
NEUTROPHILS NFR BLD AUTO: 58.6 % (ref 42.7–76)
NRBC BLD AUTO-RTO: 0 /100 WBC (ref 0–0.2)
PLATELET # BLD AUTO: 261 10*3/MM3 (ref 140–450)
PMV BLD AUTO: 9.6 FL (ref 6–12)
POTASSIUM SERPL-SCNC: 4.2 MMOL/L (ref 3.5–5.2)
PROT SERPL-MCNC: 7.7 G/DL (ref 6–8.5)
RBC # BLD AUTO: 5.33 10*6/MM3 (ref 4.14–5.8)
SODIUM SERPL-SCNC: 137 MMOL/L (ref 136–145)
TRIGL SERPL-MCNC: 171 MG/DL (ref 0–150)
VLDLC SERPL-MCNC: 34.2 MG/DL
WBC # BLD AUTO: 8.24 10*3/MM3 (ref 3.4–10.8)

## 2020-09-28 PROCEDURE — C1894 INTRO/SHEATH, NON-LASER: HCPCS | Performed by: INTERNAL MEDICINE

## 2020-09-28 PROCEDURE — 25010000003 CEFAZOLIN IN DEXTROSE 2-4 GM/100ML-% SOLUTION: Performed by: INTERNAL MEDICINE

## 2020-09-28 PROCEDURE — C1764 EVENT RECORDER, CARDIAC: HCPCS | Performed by: INTERNAL MEDICINE

## 2020-09-28 PROCEDURE — 85025 COMPLETE CBC W/AUTO DIFF WBC: CPT | Performed by: INTERNAL MEDICINE

## 2020-09-28 PROCEDURE — 0 IOPAMIDOL PER 1 ML: Performed by: INTERNAL MEDICINE

## 2020-09-28 PROCEDURE — 83036 HEMOGLOBIN GLYCOSYLATED A1C: CPT | Performed by: INTERNAL MEDICINE

## 2020-09-28 PROCEDURE — 25010000002 MIDAZOLAM PER 1 MG: Performed by: INTERNAL MEDICINE

## 2020-09-28 PROCEDURE — 80053 COMPREHEN METABOLIC PANEL: CPT | Performed by: INTERNAL MEDICINE

## 2020-09-28 PROCEDURE — 82962 GLUCOSE BLOOD TEST: CPT

## 2020-09-28 PROCEDURE — 33285 INSJ SUBQ CAR RHYTHM MNTR: CPT | Performed by: INTERNAL MEDICINE

## 2020-09-28 PROCEDURE — 25010000002 FENTANYL CITRATE (PF) 100 MCG/2ML SOLUTION: Performed by: INTERNAL MEDICINE

## 2020-09-28 PROCEDURE — 80061 LIPID PANEL: CPT | Performed by: INTERNAL MEDICINE

## 2020-09-28 PROCEDURE — 93458 L HRT ARTERY/VENTRICLE ANGIO: CPT | Performed by: INTERNAL MEDICINE

## 2020-09-28 PROCEDURE — C1769 GUIDE WIRE: HCPCS | Performed by: INTERNAL MEDICINE

## 2020-09-28 DEVICE — ICM LP/RECRD REVEAL LINQ MEDTRONIC: Type: IMPLANTABLE DEVICE | Status: FUNCTIONAL

## 2020-09-28 RX ORDER — ASPIRIN 81 MG/1
81 TABLET ORAL DAILY
Status: DISCONTINUED | OUTPATIENT
Start: 2020-09-29 | End: 2020-09-28 | Stop reason: HOSPADM

## 2020-09-28 RX ORDER — MIDAZOLAM HYDROCHLORIDE 1 MG/ML
INJECTION INTRAMUSCULAR; INTRAVENOUS AS NEEDED
Status: DISCONTINUED | OUTPATIENT
Start: 2020-09-28 | End: 2020-09-28 | Stop reason: HOSPADM

## 2020-09-28 RX ORDER — SODIUM CHLORIDE 0.9 % (FLUSH) 0.9 %
10 SYRINGE (ML) INJECTION AS NEEDED
Status: DISCONTINUED | OUTPATIENT
Start: 2020-09-28 | End: 2020-09-28 | Stop reason: HOSPADM

## 2020-09-28 RX ORDER — SODIUM CHLORIDE 0.9 % (FLUSH) 0.9 %
3 SYRINGE (ML) INJECTION EVERY 12 HOURS SCHEDULED
Status: DISCONTINUED | OUTPATIENT
Start: 2020-09-28 | End: 2020-09-28 | Stop reason: HOSPADM

## 2020-09-28 RX ORDER — SODIUM CHLORIDE 9 MG/ML
3 INJECTION, SOLUTION INTRAVENOUS CONTINUOUS
Status: ACTIVE | OUTPATIENT
Start: 2020-09-28 | End: 2020-09-28

## 2020-09-28 RX ORDER — LIDOCAINE HYDROCHLORIDE 10 MG/ML
INJECTION, SOLUTION EPIDURAL; INFILTRATION; INTRACAUDAL; PERINEURAL AS NEEDED
Status: DISCONTINUED | OUTPATIENT
Start: 2020-09-28 | End: 2020-09-28 | Stop reason: HOSPADM

## 2020-09-28 RX ORDER — ASPIRIN 81 MG/1
324 TABLET, CHEWABLE ORAL ONCE
Status: COMPLETED | OUTPATIENT
Start: 2020-09-28 | End: 2020-09-28

## 2020-09-28 RX ORDER — CEFAZOLIN SODIUM 2 G/100ML
2 INJECTION, SOLUTION INTRAVENOUS ONCE
Status: COMPLETED | OUTPATIENT
Start: 2020-09-28 | End: 2020-09-28

## 2020-09-28 RX ORDER — CEFAZOLIN SODIUM 1 G/3ML
2 INJECTION, POWDER, FOR SOLUTION INTRAMUSCULAR; INTRAVENOUS
Status: SHIPPED | OUTPATIENT
Start: 2020-09-28 | End: 2020-09-28

## 2020-09-28 RX ORDER — ALPRAZOLAM 0.5 MG/1
0.5 TABLET ORAL ONCE
Status: COMPLETED | OUTPATIENT
Start: 2020-09-28 | End: 2020-09-28

## 2020-09-28 RX ORDER — FENTANYL CITRATE 50 UG/ML
INJECTION, SOLUTION INTRAMUSCULAR; INTRAVENOUS AS NEEDED
Status: DISCONTINUED | OUTPATIENT
Start: 2020-09-28 | End: 2020-09-28 | Stop reason: HOSPADM

## 2020-09-28 RX ADMIN — SODIUM CHLORIDE 3 ML/KG/HR: 9 INJECTION, SOLUTION INTRAVENOUS at 10:09

## 2020-09-28 RX ADMIN — ASPIRIN 81 MG CHEWABLE TABLET 324 MG: 81 TABLET CHEWABLE at 09:45

## 2020-09-28 RX ADMIN — ALPRAZOLAM 0.5 MG: 0.5 TABLET ORAL at 09:45

## 2020-09-28 RX ADMIN — CEFAZOLIN SODIUM 2 G: 2 INJECTION, SOLUTION INTRAVENOUS at 11:25

## 2020-10-02 ENCOUNTER — APPOINTMENT (OUTPATIENT)
Dept: LAB | Facility: HOSPITAL | Age: 55
End: 2020-10-02

## 2020-10-05 ENCOUNTER — APPOINTMENT (OUTPATIENT)
Dept: CARDIOLOGY | Facility: HOSPITAL | Age: 55
End: 2020-10-05

## 2020-10-09 ENCOUNTER — CLINICAL SUPPORT NO REQUIREMENTS (OUTPATIENT)
Dept: CARDIOLOGY | Facility: CLINIC | Age: 55
End: 2020-10-09

## 2020-10-09 DIAGNOSIS — I25.119 CORONARY ARTERY DISEASE INVOLVING NATIVE CORONARY ARTERY OF NATIVE HEART WITH ANGINA PECTORIS (HCC): Primary | ICD-10-CM

## 2020-10-09 DIAGNOSIS — I47.20 VENTRICULAR TACHYCARDIA (HCC): ICD-10-CM

## 2020-10-09 DIAGNOSIS — R55 SYNCOPE, UNSPECIFIED SYNCOPE TYPE: ICD-10-CM

## 2020-10-09 PROCEDURE — 99024 POSTOP FOLLOW-UP VISIT: CPT | Performed by: INTERNAL MEDICINE

## 2020-10-09 NOTE — PROGRESS NOTES
WOUND CHECK    10/09/2020    Bret Rodriguez, : 1965    [facility]      Wound Location: Mid-sternum Loop Recorder    Dressing Removed [x]        Old Dressing Appearance:  Clean, dry [x]                 Old, bloody drainage []                            Moist, serous drainage []                Moist, thick yellow/green drainage []       Wound Appearance: Redness []                  Drainage []                  Culture obtained []        Color: N/A     Consistency: N/A     Amount: none         Gloves used, wound cleansed with sterile 4x4 and peroxide [x]       MD notified [] MD orders:     Antibiotic started []  If checked, type   Other:     Appointment for follow-up scheduled for 3 months post procedure [x]    Future Appointments   Date Time Provider Department Center   2021  2:30 PM Casey Milton IV, MD [facility] None           Liliam Mcnally MA, 10/09/20      MD Signature:______________________________ Completed By/Date:

## 2020-11-04 ENCOUNTER — TELEPHONE (OUTPATIENT)
Dept: CARDIOLOGY | Facility: CLINIC | Age: 55
End: 2020-11-04

## 2020-11-04 NOTE — TELEPHONE ENCOUNTER
Rolando Kate CRNA at [facility] is calling to request cardiac clearance for a cataract surgery scheduled tomorrow with IV sedation. OK to continue ASA daily and clear?

## 2020-11-12 ENCOUNTER — HOSPITAL ENCOUNTER (OUTPATIENT)
Dept: CARDIOLOGY | Facility: HOSPITAL | Age: 55
Discharge: HOME | End: 2020-11-12

## 2020-11-12 DIAGNOSIS — Z00.6 EXAMINATION FOR NORMAL COMPARISON OR CONTROL IN CLINICAL RESEARCH: ICD-10-CM

## 2020-12-23 ENCOUNTER — TELEPHONE (OUTPATIENT)
Dept: CARDIOLOGY | Facility: CLINIC | Age: 55
End: 2020-12-23

## 2020-12-23 DIAGNOSIS — R00.2 PALPITATIONS: ICD-10-CM

## 2020-12-23 DIAGNOSIS — I47.20 VENTRICULAR TACHYCARDIA (HCC): Primary | ICD-10-CM

## 2020-12-23 NOTE — TELEPHONE ENCOUNTER
Pt calling w complaints of rapid heart rate. States HR has been fluctuating from 120-160 when he's up and moving. Encouraged pt to present to PCP office for EKG (device department currently closed). Have results faxed to our office. May take extra metoprolol PRN to help w palps. Pt verbalized understanding and is agreeable. Orders placed and faxed to PCP.

## 2021-01-08 ENCOUNTER — TELEPHONE (OUTPATIENT)
Dept: CARDIOLOGY | Facility: CLINIC | Age: 56
End: 2021-01-08

## 2021-01-11 PROBLEM — Z45.09 ENCOUNTER FOR LOOP RECORDER CHECK: Status: ACTIVE | Noted: 2021-01-11

## 2021-01-11 NOTE — PROGRESS NOTES
"[facility]  Office Visit Note    DATE: 01/12/2021    IDENTIFICATION: Bret Rodriguez is a 55 y.o. male who is  and resides in [place], [place]    REASON FOR VISIT:  • Coronary artery disease  • Syncope  • Hypertension   • Hyperlipidemia            Bret Rodriguez returns today for follow-up of his coronary artery disease, syncope and cardiac risk factors.  At his last visit patient was following up after recent presentation to [facility] after suffering a syncopal spell and complaining of fatigue.  We scheduled him for a cardiac catheterization which he had performed in September.  Coronary angiography showed moderate one-vessel disease but no intervention needed.  A loop recorder implant was also placed to evaluate for any arrhythmias as a cause of his syncopal episodes.  Since then the patient has had no further syncopal spells but he did contact our office on 12/23/2020 reporting high blood pressure and feeling his heart beating fast.  He was instructed to take an extra dose of metoprolol which she does not feel really helped.  He went to his primary care provider and an EKG was obtained but I do not have that result.  According to the patient it was \"okay\".  His loop recorder was interrogated today and it showed no events or arrhythmias particularly on that day and all other days.  He denies any chest pain/pressure tightness or increased shortness of breath.  Blood pressures controlled today at 126/74.    Review of Systems   Constitution: Negative for malaise/fatigue.   Eyes: Negative for vision loss in left eye and vision loss in right eye.   Cardiovascular: Positive for irregular heartbeat and palpitations. Negative for chest pain, dyspnea on exertion, near-syncope, orthopnea, paroxysmal nocturnal dyspnea and syncope.   Musculoskeletal: Negative for myalgias.   Neurological: Negative for brief paralysis, excessive daytime sleepiness, focal weakness, " numbness, paresthesias and weakness.   All other systems reviewed and are negative.      The patient's past medical, social, family history and ROS reviewed in the patient's electronic medical record.    Allergies   Allergen Reactions   • Atorvastatin Myalgia         Current Outpatient Medications:   •  albuterol (PROVENTIL) (2.5 MG/3ML) 0.083% nebulizer solution, Take 2.5 mg by nebulization Every 4 (Four) Hours As Needed for Wheezing., Disp: , Rfl:   •  aspirin 81 MG EC tablet, Take 1 tablet by mouth Daily., Disp:  , Rfl:   •  Empagliflozin (Jardiance) 10 MG tablet, Take 10 mg by mouth Daily., Disp: 90 tablet, Rfl: 1  •  fenofibrate 160 MG tablet, Take 1 tablet by mouth Daily., Disp:  , Rfl:   •  finasteride (PROSCAR) 5 MG tablet, Take 5 mg by mouth Daily., Disp: , Rfl:   •  FLUoxetine (PROzac) 20 MG capsule, Take 60 mg by mouth Daily., Disp: , Rfl:   •  gabapentin (NEURONTIN) 600 MG tablet, Take 600 mg by mouth As Needed., Disp: , Rfl:   •  hydrALAZINE (APRESOLINE) 25 MG tablet, Take 1 tablet by mouth Daily., Disp:  , Rfl:   •  insulin detemir (Levemir FlexTouch) 100 UNIT/ML injection, Inject 20 Units under the skin into the appropriate area as directed Daily., Disp: , Rfl:   •  LORazepam (ATIVAN) 0.5 MG tablet, Take 0.5 mg by mouth 2 (Two) Times a Day., Disp: , Rfl:   •  metoprolol tartrate (LOPRESSOR) 50 MG tablet, Take 1 tablet by mouth 2 (Two) Times a Day., Disp: 180 tablet, Rfl: 3  •  nitroglycerin (NITROSTAT) 0.4 MG SL tablet, Place 1 tablet under the tongue Every 5 (Five) Minutes As Needed for Chest Pain., Disp: 25 tablet, Rfl: 5  •  potassium citrate (UROCIT-K) 5 MEQ (540 MG) CR tablet, Take  by mouth 3 (Three) Times a Day With Meals., Disp: , Rfl:   •  predniSONE (DELTASONE) 20 MG tablet, Take 20 mg by mouth As Needed., Disp: , Rfl:   •  rosuvastatin (CRESTOR) 10 MG tablet, Take 1 tablet by mouth Every Night., Disp:  , Rfl:   •  spironolactone (ALDACTONE) 50 MG tablet, Take 1 tablet by mouth 2 (Two) Times  "a Day., Disp: 90 tablet, Rfl: 4  •  tamsulosin (FLOMAX) 0.4 MG capsule 24 hr capsule, Take 1 capsule by mouth Daily., Disp: , Rfl:   •  traMADol (ULTRAM) 50 MG tablet, Take 25 mg by mouth As Needed for Moderate Pain ., Disp: , Rfl:     Past Medical History:   Diagnosis Date   • Anxiety    • Arthritis    • Congestive heart failure (CHF) (CMS/[facility])    • Depression    • Diabetes mellitus (CMS/HCC)    • Easy bruising    • Heart attack (CMS/HCC)    • Hyperlipidemia    • Hypertension    • Kidney stones    • Pulmonary fibrosis (CMS/HCC)    • Renal failure    • Sleep apnea with use of continuous positive airway pressure (CPAP)    • Syncope        Past Surgical History:   Procedure Laterality Date   • CARDIAC CATHETERIZATION     • CARDIAC CATHETERIZATION Left 9/28/2020    Procedure: Left Heart Cath;  Surgeon: Casey Milton IV, MD;  Location:  [facility];  Service: Cardiovascular;  Laterality: Left;   • CARDIAC ELECTROPHYSIOLOGY PROCEDURE N/A 9/28/2020    Procedure: Loop insertion;  Surgeon: Casey Milton IV, MD;  Location:  [facility];  Service: Cardiovascular;  Laterality: N/A;   • GALLBLADDER SURGERY     • LUNG BIOPSY     • TONSILLECTOMY     • TONSILLECTOMY AND ADENOIDECTOMY         Family History   Problem Relation Age of Onset   • Hypertension Mother    • Cancer Mother    • Parkinsonism Father    • Coronary artery disease Father    • Hypertension Sister    • Emphysema Sister    • Heart attack Brother 46   • Diabetes Brother        Social History     Tobacco Use   • Smoking status: Never Smoker   • Smokeless tobacco: Never Used   Substance Use Topics   • Alcohol use: Never     Frequency: Never           Blood pressure 126/74, pulse 83, height 180.3 cm (71\"), weight 104 kg (230 lb), SpO2 97 %.  Body mass index is 32.08 kg/m².  Vitals:    01/12/21 0927   Patient Position: Sitting       Constitutional:       Appearance: Healthy appearance. Well-developed.   Eyes:     "  General: Lids are normal. No scleral icterus.     Conjunctiva/sclera: Conjunctivae normal.   HENT:      Head: Normocephalic and atraumatic.   Neck:      Musculoskeletal: Normal range of motion.      Thyroid: No thyromegaly.      Vascular: No carotid bruit or JVD.   Pulmonary:      Effort: Pulmonary effort is normal.      Breath sounds: Normal breath sounds. No wheezing. No rhonchi. No rales.   Cardiovascular:      Normal rate. Regular rhythm.      Murmurs: There is no murmur.      No gallop. No rub.   Pulses:     Intact distal pulses.   Edema:     Peripheral edema absent.   Abdominal:      General: There is no distension.      Palpations: Abdomen is soft. There is no abdominal mass.   Skin:     General: Skin is warm and dry.      Findings: No rash.   Neurological:      General: No focal deficit present.      Mental Status: Alert and oriented to person, place, and time.      Gait: Gait is intact.   Psychiatric:         Attention and Perception: Attention normal.         Mood and Affect: Mood normal.         Behavior: Behavior normal.         Data Review (reviewed with patient):     Procedures    Lab Results   Component Value Date    GLUCOSE 165 (H) 09/28/2020    BUN 26 (H) 09/28/2020    CREATININE 1.57 (H) 09/28/2020    EGFRIFNONA 46 (L) 09/28/2020    BCR 16.6 09/28/2020    K 4.2 09/28/2020    CO2 25.0 09/28/2020    CALCIUM 10.4 09/28/2020    ALBUMIN 4.80 09/28/2020    ALKPHOS 48 09/28/2020    AST 25 09/28/2020    ALT 27 09/28/2020      Lab Results   Component Value Date    CHOL 137 09/28/2020    TRIG 171 (H) 09/28/2020    HDL 38 (L) 09/28/2020    LDL 65 09/28/2020     Lab Results   Component Value Date    HGBA1C 6.40 (H) 09/28/2020     Lab Results   Component Value Date    WBC 8.24 09/28/2020    HGB 16.3 09/28/2020    HCT 48.0 09/28/2020    MCV 90.1 09/28/2020     09/28/2020     No results found for: TSH  Loop recorder interrogation 1/12/2021 no events, tachycardias or other arrhythmias.  Normal sinus  rhythm.       Problem List Items Addressed This Visit        Other    Coronary artery disease involving native coronary artery of native heart with angina pectoris (CMS/HCC) - Primary    Overview     · Cardiac catherization (07/29/2009): Mild CAD. Preserved LVEF.   · [facility]chocardiogram at [facility] (03/20/2014): LVEF 50-55%. Mild aortic valve sclerosis without stenosis.   · Cardiac catheterization by [facility] (2015):  Moderate disease.  · Cardiac catherization (11/09/2017): Noncritical CAD. Patient inferior wall fixed defect could be due to small vessel disease.   · CXR (09/17/2020): No acute disease.   · Echocardiogram (09/14/2017): EF 55-60%.  No significant valvular abnormality  · Nuclear stress test (09/18/2020): Fixed inferior wall defect probably diaphragmatic attenuation or inferior infarct. No ischemia seen. LVEF 45%.  · Echocardiogram (09/18/2020): EF 60%.  No significant valve abnormality  · Cardiac catheterization (9/28/2020): Moderate 1-vessel CAD (mid LAD/D1 bifurcation).  Normal LV filling pressure         Current Assessment & Plan     · No signs or symptoms of angina  · Continue aspirin 81 mg daily  · Continue metoprolol tartrate 50 mg twice daily  · Sublingual nitroglycerin for any episodes of angina         Relevant Medications    metoprolol tartrate (LOPRESSOR) 50 MG tablet    aspirin 81 MG EC tablet    Essential hypertension    Overview     · Severe hypertension since early adulthood  • Target blood pressure <130/80 mmHg  • Renal duplex (9/18/2020): No evidence of renal artery stenosis         Current Assessment & Plan     · Hypertension is controlled  · Continue metoprolol tartrate 50 mg twice daily  · Continue spironolactone 50 mg daily         Relevant Medications    metoprolol tartrate (LOPRESSOR) 50 MG tablet    hydrALAZINE (APRESOLINE) 25 MG tablet    spironolactone (ALDACTONE) 50 MG tablet    Hyperlipidemia LDL goal <70    Overview     • High intensity statin therapy indicated given  the presence of CAD  · Intolerant to atorvastatin due to myalgias         Current Assessment & Plan     · Continue Crestor 10 mg daily  · LDL 65         Relevant Medications    fenofibrate 160 MG tablet    rosuvastatin (CRESTOR) 10 MG tablet    Syncope    Overview     · 10-beat NSVT on tele at [facility]  · History of unexplained syncope with rapid palpitations concerning for arrhythmia   · Echocardiogram (09/18/2020): EF 60%.  No significant valve abnormality  · Medtronic loop recorder implantation, 9/28/2020             The patient is had no further syncopal spells and loop recorder interrogation shows no arrhythmias or tachycardias or other events.  We will continue his current medications.  He will follow-up 6 months or sooner if needed.       · Continue current medications  Return in about 6 months (around 7/12/2021), or if symptoms worsen or fail to improve, for Follow-up with Dr. Milton next visit.      Christina Hutchison APRN  1/12/2021

## 2021-01-12 ENCOUNTER — OFFICE VISIT (OUTPATIENT)
Dept: CARDIOLOGY | Facility: CLINIC | Age: 56
End: 2021-01-12

## 2021-01-12 VITALS
HEIGHT: 71 IN | OXYGEN SATURATION: 97 % | WEIGHT: 230 LBS | DIASTOLIC BLOOD PRESSURE: 74 MMHG | BODY MASS INDEX: 32.2 KG/M2 | HEART RATE: 83 BPM | SYSTOLIC BLOOD PRESSURE: 126 MMHG

## 2021-01-12 DIAGNOSIS — E78.5 HYPERLIPIDEMIA LDL GOAL <70: ICD-10-CM

## 2021-01-12 DIAGNOSIS — R55 SYNCOPE, UNSPECIFIED SYNCOPE TYPE: ICD-10-CM

## 2021-01-12 DIAGNOSIS — I25.119 CORONARY ARTERY DISEASE INVOLVING NATIVE CORONARY ARTERY OF NATIVE HEART WITH ANGINA PECTORIS (HCC): Primary | ICD-10-CM

## 2021-01-12 DIAGNOSIS — I10 ESSENTIAL HYPERTENSION: ICD-10-CM

## 2021-01-12 PROCEDURE — 99214 OFFICE O/P EST MOD 30 MIN: CPT | Performed by: NURSE PRACTITIONER

## 2021-01-12 PROCEDURE — 93291 INTERROG DEV EVAL SCRMS IP: CPT | Performed by: NURSE PRACTITIONER

## 2021-01-12 RX ORDER — ASPIRIN 81 MG/1
81 TABLET ORAL DAILY
Start: 2021-01-12

## 2021-01-12 RX ORDER — METOPROLOL TARTRATE 50 MG/1
50 TABLET, FILM COATED ORAL 2 TIMES DAILY
Qty: 180 TABLET | Refills: 3 | Status: SHIPPED | OUTPATIENT
Start: 2021-01-12 | End: 2021-07-16 | Stop reason: SDUPTHER

## 2021-01-12 RX ORDER — ROSUVASTATIN CALCIUM 10 MG/1
10 TABLET, COATED ORAL NIGHTLY
Start: 2021-01-12 | End: 2021-07-16 | Stop reason: SDUPTHER

## 2021-01-12 RX ORDER — HYDRALAZINE HYDROCHLORIDE 25 MG/1
25 TABLET, FILM COATED ORAL DAILY
Start: 2021-01-12 | End: 2021-01-12 | Stop reason: SDUPTHER

## 2021-01-12 RX ORDER — SPIRONOLACTONE 50 MG/1
50 TABLET, FILM COATED ORAL 2 TIMES DAILY
Qty: 90 TABLET | Refills: 4 | Status: SHIPPED | OUTPATIENT
Start: 2021-01-12 | End: 2021-07-16 | Stop reason: SDUPTHER

## 2021-01-12 RX ORDER — HYDRALAZINE HYDROCHLORIDE 25 MG/1
25 TABLET, FILM COATED ORAL DAILY
Start: 2021-01-12 | End: 2021-07-16 | Stop reason: SDUPTHER

## 2021-01-12 RX ORDER — FENOFIBRATE 160 MG/1
160 TABLET ORAL DAILY
Start: 2021-01-12 | End: 2021-07-16 | Stop reason: SDUPTHER

## 2021-01-12 NOTE — ASSESSMENT & PLAN NOTE
· No signs or symptoms of angina  · Continue aspirin 81 mg daily  · Continue metoprolol tartrate 50 mg twice daily  · Sublingual nitroglycerin for any episodes of angina

## 2021-01-12 NOTE — ASSESSMENT & PLAN NOTE
· Hypertension is controlled  · Continue metoprolol tartrate 50 mg twice daily  · Continue spironolactone 50 mg daily

## 2021-01-15 ENCOUNTER — PRIOR AUTHORIZATION (OUTPATIENT)
Dept: CARDIOLOGY | Facility: CLINIC | Age: 56
End: 2021-01-15

## 2021-01-15 NOTE — TELEPHONE ENCOUNTER
Bret Rodriguez     Ocasio: [id number]     - PA Case ID: [id number]    Need help? Call us at [phone]  Status    Sent to Plan today  Drug  Spironolactone 50MG tablets  Form  [employer] Electronic Prior Authorization Form (2017 NCPDP)

## 2021-02-25 DIAGNOSIS — E11.9 TYPE 2 DIABETES MELLITUS WITHOUT COMPLICATION, WITH LONG-TERM CURRENT USE OF INSULIN (HCC): ICD-10-CM

## 2021-02-25 DIAGNOSIS — Z79.4 TYPE 2 DIABETES MELLITUS WITHOUT COMPLICATION, WITH LONG-TERM CURRENT USE OF INSULIN (HCC): ICD-10-CM

## 2021-02-25 RX ORDER — EMPAGLIFLOZIN 10 MG/1
1 TABLET, FILM COATED ORAL DAILY
Qty: 90 TABLET | Refills: 1 | Status: SHIPPED | OUTPATIENT
Start: 2021-02-25 | End: 2021-07-14 | Stop reason: SDUPTHER

## 2021-07-13 NOTE — PROGRESS NOTES
"[facility]      Identification: Bret Rodriguez is a 55 y.o. male who is  and resides in [place], [place]    Reason for visit:  · Coronary Artery Disease      Subjective      Bret Rodriguez presents to [facility] for followup.    Greg returns to the office today for routine follow-up.  He states that he is doing quite well.  He states his blood pressure is never been as well controlled.  He denies any exertional chest pressure to suggest angina.  He is tolerating his medications.  His only complaint today is that of erectile dysfunction and requests medication for assistance.  He has not needed sublingual nitroglycerin.    His loop recorder was interrogated today and shows no arrhythmia or pause.          Review of Systems   Cardiovascular: Positive for leg swelling and palpitations.   Respiratory: Negative.        Objective     /86 (BP Location: Left arm, Patient Position: Sitting, Cuff Size: Adult)   Pulse 85   Ht 180.3 cm (71\")   Wt 100 kg (221 lb)   SpO2 96%   BMI 30.82 kg/m²       Constitutional:       Appearance: Healthy appearance. Well-developed.   Eyes:      General: No scleral icterus.  HENT:      Head: Normocephalic and atraumatic.   Neck:      Vascular: No carotid bruit or JVD. JVD normal.   Pulmonary:      Effort: Pulmonary effort is normal.      Breath sounds: Normal breath sounds.   Cardiovascular:      Normal rate. Regular rhythm.      Murmurs: There is no murmur.      No gallop.   Musculoskeletal:      Extremities: No clubbing present.Skin:     General: Skin is warm and dry. There is no cyanosis.   Neurological:      Mental Status: Alert.   Psychiatric:         Attention and Perception: Attention normal.         Behavior: Behavior normal.         Result Review :    Lab Results   Component Value Date    GLUCOSE 165 (H) 09/28/2020    BUN 26 (H) 09/28/2020    CREATININE 1.57 (H) 09/28/2020    EGFRIFNONA 46 (L) 09/28/2020    BCR 16.6 09/28/2020    K " 4.2 09/28/2020    CO2 25.0 09/28/2020    CALCIUM 10.4 09/28/2020    ALBUMIN 4.80 09/28/2020    AST 25 09/28/2020    ALT 27 09/28/2020     Lab Results   Component Value Date    WBC 8.24 09/28/2020    HGB 16.3 09/28/2020    HCT 48.0 09/28/2020    MCV 90.1 09/28/2020     09/28/2020     Lab Results   Component Value Date    CHOL 137 09/28/2020    TRIG 171 (H) 09/28/2020    HDL 38 (L) 09/28/2020    LDL 65 09/28/2020     Lab Results   Component Value Date    HGBA1C 6.40 (H) 09/28/2020             Assessment     Problem List Items Addressed This Visit        Cardiology Problems    Syncope    Overview     · 10-beat NSVT on tele at [facility]  · History of unexplained syncope with rapid palpitations concerning for arrhythmia   · Echocardiogram (09/18/2020): EF 60%.  No significant valve abnormality  · Medtronic loop recorder implantation, 9/28/2020         Current Assessment & Plan     · No recurrence of syncope  · No arrhythmia on recent loop interrogation         Coronary artery disease involving native coronary artery of native heart with angina pectoris (CMS/[facility]) - Primary (Chronic)    Overview     · Cardiac catherization (07/29/2009): Mild CAD. Preserved LVEF.   · Echocardiogram at [facility] (03/20/2014): LVEF 50-55%. Mild aortic valve sclerosis without stenosis.   · Cardiac catheterization by [facility] (2015):  Moderate disease.  · Cardiac catherization (11/09/2017): Noncritical CAD. Patient inferior wall fixed defect could be due to small vessel disease.   · CXR (09/17/2020): No acute disease.   · Echocardiogram (09/14/2017): EF 55-60%.  No significant valvular abnormality  · Nuclear stress test (09/18/2020): Fixed inferior wall defect probably diaphragmatic attenuation or inferior infarct. No ischemia seen. LVEF 45%.  · Echocardiogram (09/18/2020): EF 60%.  No significant valve abnormality  · Cardiac catheterization (9/28/2020): Moderate 1-vessel CAD (mid LAD/D1 bifurcation).  Normal LV filling  pressure         Current Assessment & Plan     · No angina  · Continue aspirin, beta-blocker, statin         Relevant Medications    amLODIPine (NORVASC) 5 MG tablet    sildenafil (Viagra) 100 MG tablet    metoprolol tartrate (LOPRESSOR) 50 MG tablet    Essential hypertension (Chronic)    Overview     · Severe hypertension since early adulthood  • Target blood pressure <130/80 mmHg  • Renal duplex (9/18/2020): No evidence of renal artery stenosis         Current Assessment & Plan     · Best control blood pressure that he has had in some time  · Continue present medical therapy         Relevant Medications    amLODIPine (NORVASC) 5 MG tablet    spironolactone (ALDACTONE) 50 MG tablet    metoprolol tartrate (LOPRESSOR) 50 MG tablet    hydrALAZINE (APRESOLINE) 25 MG tablet    Hyperlipidemia LDL goal <70 (Chronic)    Overview     • High intensity statin therapy indicated given the presence of CAD  · Intolerant to atorvastatin due to myalgias         Current Assessment & Plan     · Continue rosuvastatin         Relevant Medications    rosuvastatin (CRESTOR) 10 MG tablet    fenofibrate 160 MG tablet       Other    Type 2 diabetes mellitus (CMS/HCC) (Chronic)    Current Assessment & Plan     · Continue Jardiance for diabetes and CV risk reduction         Relevant Medications    empagliflozin (Jardiance) 10 MG tablet tablet          Plan   • Continue present medical therapy  • Blood work should be obtained at PCP office and forwarded to me for review  • Viagra 100 mg tablets prescribed with instructions on use and warning regarding use of sublingual nitroglycerin      Follow-up   Return in about 8 months (around 3/16/2022).        Sadiq Milton MD, [facility], [facility]  7/16/2021     Scribed for Casey Milton IV, MD by Maia Markham.  07/16/21   10:47 EDT    I have personally performed the services described in this document as scribed by the above individual, and it is both accurate and complete.  Casey MEDRANO  Yoan VASQUEZ MD  7/16/2021  10:52 EDT

## 2021-07-14 DIAGNOSIS — E11.9 TYPE 2 DIABETES MELLITUS WITHOUT COMPLICATION, WITH LONG-TERM CURRENT USE OF INSULIN (HCC): ICD-10-CM

## 2021-07-14 DIAGNOSIS — Z79.4 TYPE 2 DIABETES MELLITUS WITHOUT COMPLICATION, WITH LONG-TERM CURRENT USE OF INSULIN (HCC): ICD-10-CM

## 2021-07-16 ENCOUNTER — OFFICE VISIT (OUTPATIENT)
Dept: CARDIOLOGY | Facility: CLINIC | Age: 56
End: 2021-07-16

## 2021-07-16 VITALS
SYSTOLIC BLOOD PRESSURE: 122 MMHG | OXYGEN SATURATION: 96 % | BODY MASS INDEX: 30.94 KG/M2 | WEIGHT: 221 LBS | HEART RATE: 85 BPM | HEIGHT: 71 IN | DIASTOLIC BLOOD PRESSURE: 86 MMHG

## 2021-07-16 DIAGNOSIS — I25.119 CORONARY ARTERY DISEASE INVOLVING NATIVE CORONARY ARTERY OF NATIVE HEART WITH ANGINA PECTORIS (HCC): Primary | Chronic | ICD-10-CM

## 2021-07-16 DIAGNOSIS — R55 SYNCOPE, UNSPECIFIED SYNCOPE TYPE: ICD-10-CM

## 2021-07-16 DIAGNOSIS — Z79.4 TYPE 2 DIABETES MELLITUS WITHOUT COMPLICATION, WITH LONG-TERM CURRENT USE OF INSULIN (HCC): ICD-10-CM

## 2021-07-16 DIAGNOSIS — E78.5 HYPERLIPIDEMIA LDL GOAL <70: ICD-10-CM

## 2021-07-16 DIAGNOSIS — E11.9 TYPE 2 DIABETES MELLITUS WITHOUT COMPLICATION, WITH LONG-TERM CURRENT USE OF INSULIN (HCC): ICD-10-CM

## 2021-07-16 DIAGNOSIS — I10 ESSENTIAL HYPERTENSION: ICD-10-CM

## 2021-07-16 PROBLEM — N18.9 CKD (CHRONIC KIDNEY DISEASE): Status: ACTIVE | Noted: 2020-09-22

## 2021-07-16 PROBLEM — N18.9 CKD (CHRONIC KIDNEY DISEASE): Chronic | Status: ACTIVE | Noted: 2020-09-22

## 2021-07-16 PROCEDURE — 99214 OFFICE O/P EST MOD 30 MIN: CPT | Performed by: INTERNAL MEDICINE

## 2021-07-16 RX ORDER — AMLODIPINE BESYLATE 5 MG/1
5 TABLET ORAL DAILY
COMMUNITY
End: 2021-07-16 | Stop reason: SDUPTHER

## 2021-07-16 RX ORDER — ROSUVASTATIN CALCIUM 10 MG/1
10 TABLET, COATED ORAL NIGHTLY
Status: ON HOLD
Start: 2021-07-16 | End: 2022-11-28 | Stop reason: SDUPTHER

## 2021-07-16 RX ORDER — SPIRONOLACTONE 50 MG/1
50 TABLET, FILM COATED ORAL 2 TIMES DAILY
Qty: 90 TABLET | Refills: 4 | Status: SHIPPED | OUTPATIENT
Start: 2021-07-16 | End: 2022-10-26 | Stop reason: SDUPTHER

## 2021-07-16 RX ORDER — HYDRALAZINE HYDROCHLORIDE 25 MG/1
25 TABLET, FILM COATED ORAL DAILY
Start: 2021-07-16 | End: 2022-10-26

## 2021-07-16 RX ORDER — SILDENAFIL 100 MG/1
100 TABLET, FILM COATED ORAL DAILY PRN
Qty: 10 TABLET | Refills: 5 | Status: ON HOLD | OUTPATIENT
Start: 2021-07-16 | End: 2022-11-28 | Stop reason: SDUPTHER

## 2021-07-16 RX ORDER — AMLODIPINE BESYLATE 5 MG/1
5 TABLET ORAL DAILY
Qty: 90 TABLET | Refills: 3 | Status: SHIPPED | OUTPATIENT
Start: 2021-07-16 | End: 2022-07-12 | Stop reason: SDUPTHER

## 2021-07-16 RX ORDER — LANOLIN ALCOHOL/MO/W.PET/CERES
1000 CREAM (GRAM) TOPICAL DAILY
COMMUNITY

## 2021-07-16 RX ORDER — METOPROLOL TARTRATE 50 MG/1
50 TABLET, FILM COATED ORAL 2 TIMES DAILY
Qty: 180 TABLET | Refills: 3 | Status: SHIPPED | OUTPATIENT
Start: 2021-07-16 | End: 2022-10-26 | Stop reason: SDUPTHER

## 2021-07-16 RX ORDER — TRAZODONE HYDROCHLORIDE 50 MG/1
50 TABLET ORAL NIGHTLY
COMMUNITY

## 2021-07-16 RX ORDER — FENOFIBRATE 160 MG/1
160 TABLET ORAL DAILY
Start: 2021-07-16

## 2021-09-29 ENCOUNTER — TELEPHONE (OUTPATIENT)
Dept: CARDIOLOGY | Facility: CLINIC | Age: 56
End: 2021-09-29

## 2021-09-29 NOTE — TELEPHONE ENCOUNTER
Pt’s spouse called to report that patient experienced a syncopal episode Monday, 9/27/2021 & a near syncopal episode today. Per spouse-9/27/7021-pt called her from his car while parked in their driveway @ approximately 4PM c/o weakness-he dropped the phone during the call. Ms. Rodriguez arrived at home approximately 15 min later & found pt slumped over the steering wheel w/drool from his mouth, states she couldn’t rouse him>>she called EMS>>spouse reports, per EMS, BP & EKG were “Ok”>>pt refused to go to the emergency room. Per spouse, whenever pt has presented to the ER w/these episodes, he’s been sent home.     Spouse reports that pt has been weak since the incident. Pt went to [facility] today and experienced another episode of weakness, no loss of consciousness, /111.    Per spouse, pt did not experience chest pain or shortness of breath either time (per spouse, pt has pulmonary disease but his baseline shortness of breath hasn’t changed).     Pt has a M[employer] loop recorder but no episodes were noted for 9/27/2021 & patient hasn’t been home since the incident at [facility] today (monitor @ home)-(to be closer to his spouse, who is at work, pt is at her sister’s house).

## 2021-09-30 NOTE — TELEPHONE ENCOUNTER
Reinterrogate the loop recorder to make sure there is no arrhythmia.  Otherwise I do not suspect cardiac etiology

## 2021-09-30 NOTE — TELEPHONE ENCOUNTER
Assisted patient w/manual transmission from loop recorder. No events for lifetime of loop recorder; presenting EGM- ST 130s bpm.

## 2021-11-15 PROCEDURE — 93298 REM INTERROG DEV EVAL SCRMS: CPT | Performed by: INTERNAL MEDICINE

## 2021-11-15 PROCEDURE — G2066 INTER DEVC REMOTE 30D: HCPCS | Performed by: INTERNAL MEDICINE

## 2022-04-14 ENCOUNTER — TELEPHONE (OUTPATIENT)
Dept: CARDIOLOGY | Facility: CLINIC | Age: 57
End: 2022-04-14
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
## 2022-05-20 PROCEDURE — G2066 INTER DEVC REMOTE 30D: HCPCS | Performed by: INTERNAL MEDICINE

## 2022-05-20 PROCEDURE — 93298 REM INTERROG DEV EVAL SCRMS: CPT | Performed by: INTERNAL MEDICINE

## 2022-07-12 DIAGNOSIS — I10 ESSENTIAL HYPERTENSION: ICD-10-CM

## 2022-07-12 RX ORDER — AMLODIPINE BESYLATE 5 MG/1
5 TABLET ORAL DAILY
Qty: 90 TABLET | Refills: 3 | Status: SHIPPED | OUTPATIENT
Start: 2022-07-12 | End: 2022-10-26

## 2022-08-21 PROCEDURE — 93298 REM INTERROG DEV EVAL SCRMS: CPT | Performed by: INTERNAL MEDICINE

## 2022-08-21 PROCEDURE — G2066 INTER DEVC REMOTE 30D: HCPCS | Performed by: INTERNAL MEDICINE

## 2022-09-21 PROCEDURE — G2066 INTER DEVC REMOTE 30D: HCPCS | Performed by: INTERNAL MEDICINE

## 2022-09-21 PROCEDURE — 93298 REM INTERROG DEV EVAL SCRMS: CPT | Performed by: INTERNAL MEDICINE

## 2022-10-26 ENCOUNTER — OFFICE VISIT (OUTPATIENT)
Dept: CARDIOLOGY | Facility: CLINIC | Age: 57
End: 2022-10-26

## 2022-10-26 VITALS
WEIGHT: 200 LBS | HEIGHT: 71 IN | OXYGEN SATURATION: 97 % | DIASTOLIC BLOOD PRESSURE: 76 MMHG | BODY MASS INDEX: 28 KG/M2 | HEART RATE: 93 BPM | SYSTOLIC BLOOD PRESSURE: 126 MMHG

## 2022-10-26 DIAGNOSIS — E11.9 TYPE 2 DIABETES MELLITUS WITHOUT COMPLICATION, WITH LONG-TERM CURRENT USE OF INSULIN: ICD-10-CM

## 2022-10-26 DIAGNOSIS — R55 SYNCOPE, UNSPECIFIED SYNCOPE TYPE: ICD-10-CM

## 2022-10-26 DIAGNOSIS — I25.119 CORONARY ARTERY DISEASE INVOLVING NATIVE CORONARY ARTERY OF NATIVE HEART WITH ANGINA PECTORIS: Primary | Chronic | ICD-10-CM

## 2022-10-26 DIAGNOSIS — I10 ESSENTIAL HYPERTENSION: ICD-10-CM

## 2022-10-26 DIAGNOSIS — Z79.4 TYPE 2 DIABETES MELLITUS WITHOUT COMPLICATION, WITH LONG-TERM CURRENT USE OF INSULIN: ICD-10-CM

## 2022-10-26 DIAGNOSIS — E78.5 HYPERLIPIDEMIA LDL GOAL <70: Chronic | ICD-10-CM

## 2022-10-26 PROCEDURE — 99214 OFFICE O/P EST MOD 30 MIN: CPT | Performed by: INTERNAL MEDICINE

## 2022-10-26 RX ORDER — LEFLUNOMIDE 20 MG/1
20 TABLET ORAL DAILY
COMMUNITY
Start: 2022-08-04

## 2022-10-26 RX ORDER — OFLOXACIN 3 MG/ML
SOLUTION/ DROPS OPHTHALMIC AS NEEDED
COMMUNITY
End: 2023-01-10

## 2022-10-26 RX ORDER — AMLODIPINE BESYLATE 10 MG/1
10 TABLET ORAL DAILY
Qty: 90 TABLET | Refills: 3 | Status: SHIPPED | OUTPATIENT
Start: 2022-10-26 | End: 2023-02-10 | Stop reason: SDUPTHER

## 2022-10-26 RX ORDER — METHOTREXATE SODIUM 25 MG/ML
INJECTION, SOLUTION INTRA-ARTERIAL; INTRAMUSCULAR; INTRAVENOUS WEEKLY
COMMUNITY

## 2022-10-26 RX ORDER — MOMETASONE FUROATE 220 UG/1
INHALANT RESPIRATORY (INHALATION) AS NEEDED
COMMUNITY
Start: 2022-10-11

## 2022-10-26 RX ORDER — HYDRALAZINE HYDROCHLORIDE 25 MG/1
TABLET, FILM COATED ORAL EVERY 12 HOURS SCHEDULED
COMMUNITY
End: 2022-10-26 | Stop reason: SDUPTHER

## 2022-10-26 RX ORDER — CLONIDINE HYDROCHLORIDE 0.2 MG/1
0.2 TABLET ORAL 3 TIMES DAILY PRN
Qty: 90 TABLET | Refills: 3 | Status: SHIPPED | OUTPATIENT
Start: 2022-10-26 | End: 2023-02-13 | Stop reason: SDUPTHER

## 2022-10-26 RX ORDER — LISINOPRIL 40 MG/1
TABLET ORAL DAILY
COMMUNITY
End: 2022-10-26 | Stop reason: SDUPTHER

## 2022-10-26 RX ORDER — SPIRONOLACTONE 50 MG/1
50 TABLET, FILM COATED ORAL 2 TIMES DAILY
Qty: 90 TABLET | Refills: 4 | Status: SHIPPED | OUTPATIENT
Start: 2022-10-26 | End: 2023-02-10 | Stop reason: SDUPTHER

## 2022-10-26 RX ORDER — HYDROCODONE BITARTRATE AND ACETAMINOPHEN 10; 325 MG/1; MG/1
1 TABLET ORAL AS NEEDED
COMMUNITY
Start: 2022-08-04

## 2022-10-26 RX ORDER — CLONIDINE HYDROCHLORIDE 0.2 MG/1
0.2 TABLET ORAL 3 TIMES DAILY PRN
COMMUNITY
Start: 2022-10-11 | End: 2022-10-26 | Stop reason: SDUPTHER

## 2022-10-26 RX ORDER — HYDRALAZINE HYDROCHLORIDE 25 MG/1
25 TABLET, FILM COATED ORAL EVERY 12 HOURS SCHEDULED
Start: 2022-10-26

## 2022-10-26 RX ORDER — OXYCODONE HYDROCHLORIDE 10 MG/1
5 TABLET ORAL
COMMUNITY
Start: 2022-09-20 | End: 2022-10-26

## 2022-10-26 RX ORDER — POTASSIUM CITRATE 10 MEQ/1
TABLET, EXTENDED RELEASE ORAL DAILY
COMMUNITY
End: 2023-01-10

## 2022-10-26 RX ORDER — METOPROLOL TARTRATE 100 MG/1
100 TABLET ORAL 2 TIMES DAILY
Qty: 180 TABLET | Refills: 1 | Status: SHIPPED | OUTPATIENT
Start: 2022-10-26 | End: 2023-02-10 | Stop reason: SDUPTHER

## 2022-10-26 RX ORDER — AMLODIPINE BESYLATE 10 MG/1
TABLET ORAL DAILY
COMMUNITY
End: 2022-10-26 | Stop reason: SDUPTHER

## 2022-10-26 RX ORDER — SEMAGLUTIDE 1.34 MG/ML
INJECTION, SOLUTION SUBCUTANEOUS WEEKLY
COMMUNITY
Start: 2022-10-17 | End: 2022-10-26 | Stop reason: SDUPTHER

## 2022-10-26 RX ORDER — FLUOXETINE HYDROCHLORIDE 20 MG/1
20 CAPSULE ORAL 3 TIMES DAILY
Qty: 90 CAPSULE | Refills: 11 | COMMUNITY
Start: 2022-03-11 | End: 2023-03-11

## 2022-10-26 RX ORDER — ERGOCALCIFEROL 1.25 MG/1
CAPSULE ORAL WEEKLY
COMMUNITY

## 2022-10-26 RX ORDER — MEDROXYPROGESTERONE ACETATE 150 MG/ML
INJECTION, SUSPENSION INTRAMUSCULAR WEEKLY
COMMUNITY

## 2022-10-26 RX ORDER — SEMAGLUTIDE 1.34 MG/ML
0.25 INJECTION, SOLUTION SUBCUTANEOUS WEEKLY
Start: 2022-10-26

## 2022-10-26 RX ORDER — LISINOPRIL 40 MG/1
40 TABLET ORAL DAILY
Qty: 90 TABLET | Refills: 3 | Status: SHIPPED | OUTPATIENT
Start: 2022-10-26 | End: 2023-02-10 | Stop reason: SDUPTHER

## 2022-10-26 RX ORDER — HYDROXYCHLOROQUINE SULFATE 200 MG/1
200 TABLET, FILM COATED ORAL DAILY
COMMUNITY
Start: 2022-10-06

## 2022-10-26 NOTE — PROGRESS NOTES
Cardiology Outpatient Visit      Identification: Bret Rodriguez is a 56 y.o. male who resides in [place], [place].    Reason for visit:  · Coronary Artery Disease (/)   · Syncope  · Loop recorder    Assessment     Problem List Items Addressed This Visit        Cardiology Problems    Coronary artery disease involving native coronary artery of native heart with angina pectoris (HCC) - Primary (Chronic)    Overview     · Cardiac catherization (07/29/2009): Mild CAD. Preserved LVEF.   · Echocardiogram at [facility] (03/20/2014): LVEF 50-55%. Mild aortic valve sclerosis without stenosis.   · Cardiac catheterization by Abiel (2015):  Moderate disease.  · Cardiac catherization (11/09/2017): Noncritical CAD. Patient inferior wall fixed defect could be due to small vessel disease.   · CXR (09/17/2020): No acute disease.   · Echocardiogram (09/14/2017): EF 55-60%.  No significant valvular abnormality  · Nuclear stress test (09/18/2020): Fixed inferior wall defect probably diaphragmatic attenuation or inferior infarct. No ischemia seen. LVEF 45%.  · Echocardiogram (09/18/2020): EF 60%.  No significant valve abnormality  · Cardiac catheterization (9/28/2020): Moderate 1-vessel CAD (mid LAD/D1 bifurcation).  Normal LV filling pressure         Current Assessment & Plan     · Functional class III anginal symptoms on 2 antianginal medications  · Recurrent syncopal episode in the context of paroxysmal tachycardia noted on loop recorder   · Patient poor candidate for pharmacologic ischemic evaluation due to IPF and history of false positive stress tests  · Cardiac catheterization with probable RFR of LAD stenosis         Relevant Medications    amLODIPine (NORVASC) 10 MG tablet    metoprolol tartrate (LOPRESSOR) 100 MG tablet    Other Relevant Orders    Case Request Cath Lab: Left Heart Cath (Completed)    Essential hypertension (Chronic)    Overview     · Severe hypertension since early adulthood  • Target blood  pressure <130/80 mmHg  • Renal duplex (9/18/2020): No evidence of renal artery stenosis         Relevant Medications    amLODIPine (NORVASC) 10 MG tablet    cloNIDine (CATAPRES) 0.2 MG tablet    lisinopril (PRINIVIL,ZESTRIL) 40 MG tablet    hydrALAZINE (APRESOLINE) 25 MG tablet    spironolactone (ALDACTONE) 50 MG tablet    metoprolol tartrate (LOPRESSOR) 100 MG tablet    Hyperlipidemia LDL goal <70 (Chronic)    Overview     • High intensity statin therapy indicated given the presence of CAD  · Intolerant to atorvastatin due to myalgias            Other    Type 2 diabetes mellitus (HCC) (Chronic)    Relevant Medications    Semaglutide,0.25 or 0.5MG/DOS, (Ozempic, 0.25 or 0.5 MG/DOSE,) 2 MG/1.5ML solution pen-injector    empagliflozin (Jardiance) 10 MG tablet tablet    Syncope    Overview     · 10-beat [facility]VT on tele at [facility]  · History of unexplained syncope with rapid palpitations concerning for arrhythmia   · Echocardiogram (09/18/2020): EF 60%.  No significant valve abnormality  · [employer] loop recorder implantation, 9/28/2020            Plan   • Cardiac catheterization via the right radial approach with likely hemodynamic evaluation of LAD  • Increase metoprolol to tartrate to 100 mg twice daily  • No ischemia, will refer to EP for syncope, paroxysmal tachycardia, possible EP study        Subjective      Mr. Rodriguez continues to have decline in function due to shortness of breath and chest tightness.  He is having palpitations with lightheaded symptoms at times.  In the last year, he has had another syncopal episode.  He feels that palpitations and anginal symptoms have worsened in the last 6 months.  He has been tolerating metoprolol 50 mg twice daily and amlodipine for angina.  He is concerned about his present symptoms being cardiac in nature.    Review of Systems   Constitutional: Negative for malaise/fatigue.   Eyes: Negative for vision loss in left eye and vision loss in right eye.   Cardiovascular:  "Negative for chest pain, dyspnea on exertion, near-syncope, orthopnea, palpitations, paroxysmal nocturnal dyspnea and syncope.   Musculoskeletal: Negative for myalgias.   Neurological: Negative for brief paralysis, excessive daytime sleepiness, focal weakness, numbness, paresthesias and weakness.   All other systems reviewed and are negative.      Objective     /76 (BP Location: Right arm, Patient Position: Sitting)   Pulse 93   Ht 180.3 cm (71\")   Wt 90.7 kg (200 lb)   SpO2 97%   BMI 27.89 kg/m²       Constitutional:       Appearance: Healthy appearance. Well-developed.   Eyes:      General: Lids are normal. No scleral icterus.  HENT:      Head: Normocephalic and atraumatic.   Neck:      Thyroid: No thyroid mass.      Vascular: No carotid bruit or JVD. JVD normal.   Pulmonary:      Effort: Pulmonary effort is normal.      Breath sounds: Normal breath sounds.   Cardiovascular:      Normal rate. Regular rhythm.      Murmurs: There is no murmur.      No gallop.   Musculoskeletal:      Extremities: No clubbing present.Skin:     General: Skin is warm and dry. There is no cyanosis.   Neurological:      General: No focal deficit present.      Mental Status: Alert.   Psychiatric:         Attention and Perception: Attention normal.         Behavior: Behavior normal. Behavior is cooperative.         Result Review  (reviewed with patient):            Labs (6/9/2022):  · Chol 160, Trig 79, HDL 55, LDL 88  · Creat 1.2, BUN 19, Na 142, K 3.9, AST 15, ALT 15  · WBC 4.9, RBC 5.29, HGB 16.7, HCT 48.9,     Sadiq Milton MD, FAC, [facility]  10/26/2022  "

## 2022-10-26 NOTE — ASSESSMENT & PLAN NOTE
· Functional class III anginal symptoms on 2 antianginal medications  · Recurrent syncopal episode in the context of paroxysmal tachycardia noted on loop recorder   · Patient poor candidate for pharmacologic ischemic evaluation due to IPF and history of false positive stress tests  · Cardiac catheterization with probable RFR of LAD stenosis

## 2022-11-03 ENCOUNTER — PREP FOR SURGERY (OUTPATIENT)
Dept: OTHER | Facility: HOSPITAL | Age: 57
End: 2022-11-03

## 2022-11-03 DIAGNOSIS — I20.0 UNSTABLE ANGINA: Primary | ICD-10-CM

## 2022-11-03 RX ORDER — SODIUM CHLORIDE 0.9 % (FLUSH) 0.9 %
10 SYRINGE (ML) INJECTION EVERY 12 HOURS SCHEDULED
Status: CANCELLED | OUTPATIENT
Start: 2022-11-03

## 2022-11-03 RX ORDER — NITROGLYCERIN 0.4 MG/1
0.4 TABLET SUBLINGUAL
Status: CANCELLED | OUTPATIENT
Start: 2022-11-03

## 2022-11-03 RX ORDER — ASPIRIN 81 MG/1
324 TABLET, CHEWABLE ORAL ONCE
Status: CANCELLED | OUTPATIENT
Start: 2022-11-03 | End: 2022-11-03

## 2022-11-03 RX ORDER — ASPIRIN 81 MG/1
81 TABLET ORAL DAILY
Status: CANCELLED | OUTPATIENT
Start: 2022-11-04

## 2022-11-03 RX ORDER — SODIUM CHLORIDE 0.9 % (FLUSH) 0.9 %
10 SYRINGE (ML) INJECTION AS NEEDED
Status: CANCELLED | OUTPATIENT
Start: 2022-11-03

## 2022-11-03 RX ORDER — ACETAMINOPHEN 325 MG/1
650 TABLET ORAL EVERY 4 HOURS PRN
Status: CANCELLED | OUTPATIENT
Start: 2022-11-03

## 2022-11-28 ENCOUNTER — HOSPITAL ENCOUNTER (OUTPATIENT)
Facility: HOSPITAL | Age: 57
Setting detail: HOSPITAL OUTPATIENT SURGERY
Discharge: HOME | End: 2022-11-28
Attending: INTERNAL MEDICINE | Admitting: INTERNAL MEDICINE
Payer: COMMERCIAL

## 2022-11-28 ENCOUNTER — DOCUMENTATION (OUTPATIENT)
Dept: CARDIAC REHAB | Facility: HOSPITAL | Age: 57
End: 2022-11-28

## 2022-11-28 VITALS
RESPIRATION RATE: 16 BRPM | DIASTOLIC BLOOD PRESSURE: 73 MMHG | SYSTOLIC BLOOD PRESSURE: 114 MMHG | BODY MASS INDEX: 27.75 KG/M2 | WEIGHT: 198.2 LBS | HEIGHT: 71 IN | OXYGEN SATURATION: 96 % | HEART RATE: 74 BPM

## 2022-11-28 DIAGNOSIS — I25.119 CORONARY ARTERY DISEASE INVOLVING NATIVE CORONARY ARTERY OF NATIVE HEART WITH ANGINA PECTORIS: ICD-10-CM

## 2022-11-28 DIAGNOSIS — R55 SYNCOPE, UNSPECIFIED SYNCOPE TYPE: ICD-10-CM

## 2022-11-28 DIAGNOSIS — I47.20 VENTRICULAR TACHYCARDIA: Primary | ICD-10-CM

## 2022-11-28 DIAGNOSIS — I20.0 UNSTABLE ANGINA: ICD-10-CM

## 2022-11-28 DIAGNOSIS — E78.5 HYPERLIPIDEMIA LDL GOAL <70: ICD-10-CM

## 2022-11-28 LAB
ALBUMIN SERPL-MCNC: 3.9 G/DL (ref 3.5–5.2)
ALBUMIN/GLOB SERPL: 1.6 G/DL
ALP SERPL-CCNC: 50 U/L (ref 39–117)
ALT SERPL W P-5'-P-CCNC: 31 U/L (ref 1–41)
ANION GAP SERPL CALCULATED.3IONS-SCNC: 12 MMOL/L (ref 5–15)
AST SERPL-CCNC: 26 U/L (ref 1–40)
BASOPHILS # BLD AUTO: 0.04 10*3/MM3 (ref 0–0.2)
BASOPHILS NFR BLD AUTO: 0.7 % (ref 0–1.5)
BILIRUB SERPL-MCNC: 0.4 MG/DL (ref 0–1.2)
BUN SERPL-MCNC: 32 MG/DL (ref 6–20)
BUN/CREAT SERPL: 26.2 (ref 7–25)
CALCIUM SPEC-SCNC: 10 MG/DL (ref 8.6–10.5)
CHLORIDE SERPL-SCNC: 107 MMOL/L (ref 98–107)
CHOLEST SERPL-MCNC: 219 MG/DL (ref 0–200)
CO2 SERPL-SCNC: 19 MMOL/L (ref 22–29)
CREAT SERPL-MCNC: 1.22 MG/DL (ref 0.76–1.27)
DEPRECATED RDW RBC AUTO: 45.5 FL (ref 37–54)
EGFRCR SERPLBLD CKD-EPI 2021: 70 ML/MIN/1.73
EOSINOPHIL # BLD AUTO: 0.2 10*3/MM3 (ref 0–0.4)
EOSINOPHIL NFR BLD AUTO: 3.5 % (ref 0.3–6.2)
ERYTHROCYTE [DISTWIDTH] IN BLOOD BY AUTOMATED COUNT: 13.7 % (ref 12.3–15.4)
GLOBULIN UR ELPH-MCNC: 2.4 GM/DL
GLUCOSE SERPL-MCNC: 109 MG/DL (ref 65–99)
HBA1C MFR BLD: 5.8 % (ref 4.8–5.6)
HCT VFR BLD AUTO: 46.2 % (ref 37.5–51)
HDLC SERPL-MCNC: 44 MG/DL (ref 40–60)
HGB BLD-MCNC: 15.6 G/DL (ref 13–17.7)
IMM GRANULOCYTES # BLD AUTO: 0.03 10*3/MM3 (ref 0–0.05)
IMM GRANULOCYTES NFR BLD AUTO: 0.5 % (ref 0–0.5)
LDLC SERPL CALC-MCNC: 125 MG/DL (ref 0–100)
LDLC/HDLC SERPL: 2.7 {RATIO}
LYMPHOCYTES # BLD AUTO: 2.13 10*3/MM3 (ref 0.7–3.1)
LYMPHOCYTES NFR BLD AUTO: 37.2 % (ref 19.6–45.3)
MCH RBC QN AUTO: 30.8 PG (ref 26.6–33)
MCHC RBC AUTO-ENTMCNC: 33.8 G/DL (ref 31.5–35.7)
MCV RBC AUTO: 91.1 FL (ref 79–97)
MONOCYTES # BLD AUTO: 0.67 10*3/MM3 (ref 0.1–0.9)
MONOCYTES NFR BLD AUTO: 11.7 % (ref 5–12)
NEUTROPHILS NFR BLD AUTO: 2.66 10*3/MM3 (ref 1.7–7)
NEUTROPHILS NFR BLD AUTO: 46.4 % (ref 42.7–76)
NRBC BLD AUTO-RTO: 0 /100 WBC (ref 0–0.2)
PLATELET # BLD AUTO: 165 10*3/MM3 (ref 140–450)
PMV BLD AUTO: 9.2 FL (ref 6–12)
POTASSIUM SERPL-SCNC: 4.5 MMOL/L (ref 3.5–5.2)
PROT SERPL-MCNC: 6.3 G/DL (ref 6–8.5)
RBC # BLD AUTO: 5.07 10*6/MM3 (ref 4.14–5.8)
SODIUM SERPL-SCNC: 138 MMOL/L (ref 136–145)
TRIGL SERPL-MCNC: 282 MG/DL (ref 0–150)
VLDLC SERPL-MCNC: 50 MG/DL (ref 5–40)
WBC NRBC COR # BLD: 5.73 10*3/MM3 (ref 3.4–10.8)

## 2022-11-28 PROCEDURE — C1874 STENT, COATED/COV W/DEL SYS: HCPCS | Performed by: INTERNAL MEDICINE

## 2022-11-28 PROCEDURE — C1753 CATH, INTRAVAS ULTRASOUND: HCPCS | Performed by: INTERNAL MEDICINE

## 2022-11-28 PROCEDURE — 83036 HEMOGLOBIN GLYCOSYLATED A1C: CPT | Performed by: NURSE PRACTITIONER

## 2022-11-28 PROCEDURE — C1769 GUIDE WIRE: HCPCS | Performed by: INTERNAL MEDICINE

## 2022-11-28 PROCEDURE — S0260 H&P FOR SURGERY: HCPCS | Performed by: PHYSICIAN ASSISTANT

## 2022-11-28 PROCEDURE — C1887 CATHETER, GUIDING: HCPCS | Performed by: INTERNAL MEDICINE

## 2022-11-28 PROCEDURE — 85347 COAGULATION TIME ACTIVATED: CPT

## 2022-11-28 PROCEDURE — 25010000002 HEPARIN (PORCINE) PER 1000 UNITS: Performed by: INTERNAL MEDICINE

## 2022-11-28 PROCEDURE — 85025 COMPLETE CBC W/AUTO DIFF WBC: CPT | Performed by: NURSE PRACTITIONER

## 2022-11-28 PROCEDURE — 25010000002 NITROGLYCERIN 100-5 MCG/ML-% SOLUTION: Performed by: INTERNAL MEDICINE

## 2022-11-28 PROCEDURE — 93458 L HRT ARTERY/VENTRICLE ANGIO: CPT | Performed by: INTERNAL MEDICINE

## 2022-11-28 PROCEDURE — 93799 UNLISTED CV SVC/PROCEDURE: CPT | Performed by: INTERNAL MEDICINE

## 2022-11-28 PROCEDURE — 0 IOPAMIDOL PER 1 ML: Performed by: INTERNAL MEDICINE

## 2022-11-28 PROCEDURE — 93571 IV DOP VEL&/PRESS C FLO 1ST: CPT | Performed by: INTERNAL MEDICINE

## 2022-11-28 PROCEDURE — 80061 LIPID PANEL: CPT | Performed by: NURSE PRACTITIONER

## 2022-11-28 PROCEDURE — 25010000002 MIDAZOLAM PER 1 MG: Performed by: INTERNAL MEDICINE

## 2022-11-28 PROCEDURE — C9600 PERC DRUG-EL COR STENT SING: HCPCS | Performed by: INTERNAL MEDICINE

## 2022-11-28 PROCEDURE — 80053 COMPREHEN METABOLIC PANEL: CPT | Performed by: NURSE PRACTITIONER

## 2022-11-28 PROCEDURE — 25010000002 PHENYLEPHRINE 10 MG/ML SOLUTION: Performed by: INTERNAL MEDICINE

## 2022-11-28 PROCEDURE — 92928 PRQ TCAT PLMT NTRAC ST 1 LES: CPT | Performed by: INTERNAL MEDICINE

## 2022-11-28 PROCEDURE — 92978 ENDOLUMINL IVUS OCT C 1ST: CPT | Performed by: INTERNAL MEDICINE

## 2022-11-28 PROCEDURE — C1725 CATH, TRANSLUMIN NON-LASER: HCPCS | Performed by: INTERNAL MEDICINE

## 2022-11-28 PROCEDURE — 25010000002 FENTANYL CITRATE (PF) 50 MCG/ML SOLUTION: Performed by: INTERNAL MEDICINE

## 2022-11-28 PROCEDURE — C1894 INTRO/SHEATH, NON-LASER: HCPCS | Performed by: INTERNAL MEDICINE

## 2022-11-28 DEVICE — XIENCE SKYPOINT™ EVEROLIMUS ELUTING CORONARY STENT SYSTEM 4.00 MM X 38 MM / RAPID-EXCHANGE
Type: IMPLANTABLE DEVICE | Status: FUNCTIONAL
Brand: XIENCE SKYPOINT™

## 2022-11-28 RX ORDER — HEPARIN SODIUM 1000 [USP'U]/ML
INJECTION, SOLUTION INTRAVENOUS; SUBCUTANEOUS
Status: DISCONTINUED | OUTPATIENT
Start: 2022-11-28 | End: 2022-11-28 | Stop reason: HOSPADM

## 2022-11-28 RX ORDER — SODIUM CHLORIDE 9 MG/ML
3 INJECTION, SOLUTION INTRAVENOUS CONTINUOUS
Status: ACTIVE | OUTPATIENT
Start: 2022-11-28 | End: 2022-11-28

## 2022-11-28 RX ORDER — SILDENAFIL 100 MG/1
100 TABLET, FILM COATED ORAL DAILY PRN
Qty: 20 TABLET | Refills: 5 | Status: SHIPPED | OUTPATIENT
Start: 2022-11-28

## 2022-11-28 RX ORDER — SODIUM CHLORIDE 0.9 % (FLUSH) 0.9 %
10 SYRINGE (ML) INJECTION AS NEEDED
Status: DISCONTINUED | OUTPATIENT
Start: 2022-11-28 | End: 2022-11-28 | Stop reason: HOSPADM

## 2022-11-28 RX ORDER — ROSUVASTATIN CALCIUM 40 MG/1
40 TABLET, COATED ORAL NIGHTLY
Qty: 90 TABLET | Refills: 3
Start: 2022-11-28

## 2022-11-28 RX ORDER — SODIUM CHLORIDE 9 MG/ML
3 INJECTION, SOLUTION INTRAVENOUS CONTINUOUS
Status: DISCONTINUED | OUTPATIENT
Start: 2022-11-28 | End: 2022-11-28 | Stop reason: HOSPADM

## 2022-11-28 RX ORDER — CLOPIDOGREL BISULFATE 75 MG/1
75 TABLET ORAL DAILY
Qty: 90 TABLET | Refills: 0 | Status: SHIPPED | OUTPATIENT
Start: 2022-11-28 | End: 2023-02-10 | Stop reason: SDUPTHER

## 2022-11-28 RX ORDER — ASPIRIN 81 MG/1
324 TABLET, CHEWABLE ORAL ONCE
Status: COMPLETED | OUTPATIENT
Start: 2022-11-28 | End: 2022-11-28

## 2022-11-28 RX ORDER — SODIUM CHLORIDE 0.9 % (FLUSH) 0.9 %
10 SYRINGE (ML) INJECTION EVERY 12 HOURS SCHEDULED
Status: DISCONTINUED | OUTPATIENT
Start: 2022-11-28 | End: 2022-11-28 | Stop reason: HOSPADM

## 2022-11-28 RX ORDER — LIDOCAINE HYDROCHLORIDE 10 MG/ML
INJECTION, SOLUTION EPIDURAL; INFILTRATION; INTRACAUDAL; PERINEURAL
Status: DISCONTINUED | OUTPATIENT
Start: 2022-11-28 | End: 2022-11-28 | Stop reason: HOSPADM

## 2022-11-28 RX ORDER — MIDAZOLAM HYDROCHLORIDE 1 MG/ML
INJECTION INTRAMUSCULAR; INTRAVENOUS
Status: DISCONTINUED | OUTPATIENT
Start: 2022-11-28 | End: 2022-11-28 | Stop reason: HOSPADM

## 2022-11-28 RX ORDER — NITROGLYCERIN 0.4 MG/1
0.4 TABLET SUBLINGUAL
Status: DISCONTINUED | OUTPATIENT
Start: 2022-11-28 | End: 2022-11-28 | Stop reason: HOSPADM

## 2022-11-28 RX ORDER — FENTANYL CITRATE 50 UG/ML
INJECTION, SOLUTION INTRAMUSCULAR; INTRAVENOUS
Status: DISCONTINUED | OUTPATIENT
Start: 2022-11-28 | End: 2022-11-28 | Stop reason: HOSPADM

## 2022-11-28 RX ORDER — CLOPIDOGREL BISULFATE 75 MG/1
TABLET ORAL
Status: DISCONTINUED | OUTPATIENT
Start: 2022-11-28 | End: 2022-11-28 | Stop reason: HOSPADM

## 2022-11-28 RX ORDER — NICARDIPINE HCL-0.9% SOD CHLOR 1 MG/10 ML
SYRINGE (ML) INTRAVENOUS
Status: DISCONTINUED | OUTPATIENT
Start: 2022-11-28 | End: 2022-11-28 | Stop reason: HOSPADM

## 2022-11-28 RX ORDER — PHENYLEPHRINE HYDROCHLORIDE 10 MG/ML
INJECTION INTRAVENOUS
Status: DISCONTINUED | OUTPATIENT
Start: 2022-11-28 | End: 2022-11-28 | Stop reason: HOSPADM

## 2022-11-28 RX ORDER — ACETAMINOPHEN 325 MG/1
650 TABLET ORAL EVERY 4 HOURS PRN
Status: DISCONTINUED | OUTPATIENT
Start: 2022-11-28 | End: 2022-11-28 | Stop reason: HOSPADM

## 2022-11-28 RX ADMIN — SODIUM CHLORIDE 3 ML/KG/HR: 9 INJECTION, SOLUTION INTRAVENOUS at 07:17

## 2022-11-28 RX ADMIN — ASPIRIN 81 MG CHEWABLE TABLET 324 MG: 81 TABLET CHEWABLE at 07:17

## 2022-11-28 NOTE — Clinical Note
The DP pulses are +1 bilaterally. The PT pulses are +1 bilaterally. The radial pulses are +1 bilaterally.

## 2022-11-28 NOTE — Clinical Note
First balloon inflation max pressure = 20 mele. First balloon inflation duration = 40 seconds. Second inflation of balloon - Max pressure = 20 mele. 2nd Inflation of balloon - Duration = 15 seconds. 2nd inflation was done at 09:25 EST. Third inflation of balloon - Max pressure = 16 mele. 3rd Inflation of balloon - Duration = 10 seconds. 3rd inflation was done at 09:26 EST.

## 2022-11-28 NOTE — Clinical Note
Hemostasis started on the right radial artery. R-Band was used in achieving hemostasis. Radial compression device applied to vessel. Hemostasis achieved successfully.

## 2022-11-28 NOTE — Clinical Note
First balloon inflation max pressure = 12 mele. First balloon inflation duration = 12 seconds. Second inflation of balloon - Max pressure = 16 mele. 2nd Inflation of balloon - Duration = 16 seconds. 2nd inflation was done at 09:15 EST. Third inflation of balloon - Max pressure = 16 mele. 3rd Inflation of balloon - Duration = 15 seconds. 3rd inflation was done at 09:15 EST. Fourth inflation of balloon - Max pressure = 15 mele. 4th Inflation  of balloon - Duration = 15 seconds. 4th inflation was done at 09:16 EST.

## 2022-11-28 NOTE — H&P
Pre-cardiac Catheterization History and Physical  [facility]      Patient:  Bret Rodriguez  :  1965  MRN: [id number]    PCP:  Terrell Maldonado MD  PHONE:  [phone]    DATE: 2022  ID: Bret Rodriguez is a 56 y.o. male resident of [place], [place]     CC: CAD with angina    PROBLEM LIST:   Active Hospital Problems    Diagnosis  POA   • **Coronary artery disease involving native coronary artery of native heart with angina pectoris (HCC) [I25.119]  Unknown     · Cardiac catherization (2009): Mild CAD. Preserved LVEF.   · Echocardiogram at [facility] (2014): LVEF 50-55%. Mild aortic valve sclerosis without stenosis.   · Cardiac catheterization by Abiel ():  Moderate disease.  · Cardiac catherization (2017): Noncritical CAD. Patient inferior wall fixed defect could be due to small vessel disease.   · CXR (2020): No acute disease.   · Echocardiogram (2017): EF 55-60%.  No significant valvular abnormality  · Nuclear stress test (2020): Fixed inferior wall defect probably diaphragmatic attenuation or inferior infarct. No ischemia seen. LVEF 45%.  · Echocardiogram (2020): EF 60%.  No significant valve abnormality  · Cardiac catheterization (2020): Moderate 1-vessel CAD (mid LAD/D1 bifurcation).  Normal LV filling pressure       BRIEF HPI: Mr. Rodriguez is a 57 y/o male with CAD, HTN, HLD, diabetes and history of syncope who presents for elective cath today. He was seen in our offices a month ago and reports symptoms of angina and palpitations that have gotten worse over the past several months. Cardiac cath was recommended and he presents in this regard.     Cardiac Risk Factors: advanced age (older than 55 for men, 65 for women), diabetes mellitus, dyslipidemia, hypertension, male gender and sedentary lifestyle    Allergies:      Allergies   Allergen Reactions   • Atorvastatin Myalgia        MEDICATIONS:  Current Outpatient Medications   Medication Instructions   • albuterol (PROVENTIL) 2.5 mg, Nebulization, Every 4 Hours PRN   • amLODIPine (NORVASC) 10 mg, Oral, Daily   • Asmanex, 30 Metered Doses, 220 MCG/ACT inhaler As Needed   • aspirin 81 mg, Oral, Daily   • cloNIDine (CATAPRES) 0.2 mg, Oral, 3 Times Daily PRN   • Diclofenac Sodium (VOLTAREN) 1 % gel gel As Needed   • empagliflozin (JARDIANCE) 10 mg, Oral, Daily   • ergocalciferol (ERGOCALCIFEROL) 1.25 MG (77431 UT) capsule Weekly   • Etanercept (Enbrel SureClick) 50 MG/ML solution auto-injector Weekly   • fenofibrate 160 mg, Oral, Daily   • finasteride (PROSCAR) 5 mg, Oral, Daily   • FLUoxetine (PROZAC) 20 mg, Oral, 3 Times Daily   • gabapentin (NEURONTIN) 600 mg, Oral, As Needed   • hydrALAZINE (APRESOLINE) 25 mg, Oral, Every 12 Hours Scheduled   • HYDROcodone-acetaminophen (NORCO)  MG per tablet 1 tablet, Oral, Every 6 Hours   • hydroxychloroquine (PLAQUENIL) 200 mg, Oral, Daily   • leflunomide (ARAVA) 20 mg, Oral, Daily   • Levemir FlexTouch 20 Units, Subcutaneous, Daily   • lisinopril (PRINIVIL,ZESTRIL) 40 mg, Oral, Daily   • LORazepam (ATIVAN) 0.5 mg, Oral, 2 Times Daily   • Methotrexate Sodium syringe Weekly   • metoprolol tartrate (LOPRESSOR) 100 mg, Oral, 2 Times Daily   • mupirocin (BACTROBAN) 2 % ointment As Needed   • nitroglycerin (NITROSTAT) 0.4 mg, Sublingual, Every 5 Minutes PRN   • ofloxacin (OCUFLOX) 0.3 % ophthalmic solution As Needed   • Ozempic (0.25 or 0.5 MG/DOSE) 0.25 mg, Subcutaneous, Weekly   • potassium citrate (UROCIT-K) 10 MEQ (1080 MG) CR tablet Daily   • predniSONE (DELTASONE) 20 mg, Oral, As Needed   • rosuvastatin (CRESTOR) 10 mg, Oral, Nightly   • sildenafil (VIAGRA) 100 mg, Oral, Daily PRN   • spironolactone (ALDACTONE) 50 mg, Oral, 2 Times Daily   • tamsulosin (FLOMAX) 0.4 MG capsule 24 hr capsule 1 capsule, Oral, Daily   • traMADol (ULTRAM) 25 mg, Oral, As Needed   • traZODone (DESYREL) 50 mg, Oral,  "Nightly   • vitamin B-12 (CYANOCOBALAMIN) 1,000 mcg, Oral, Daily       Past medical & surgical history, social and family history reviewed in the electronic medical record.    ROS: Pertinent positives listed in the HPI and problem list above. All others reviewed and negative.     Physical Exam:   /71 (BP Location: Left arm, Patient Position: Lying)   Pulse 73   Ht 180.3 cm (71\")   Wt 89.9 kg (198 lb 3.2 oz)   SpO2 96%   BMI 27.64 kg/m²     Constitutional:    Alert, cooperative, in no acute distress   Neck:     No Jugular venous distention, adenopathy, or thyromegaly noted.    Heart:    Regular rhythm and normal rate, normal S1 and S2, no murmurs,gallops, rubs, or clicks. No distinct PMI noted.    Lungs:     Clear to auscultation bilaterally, respirations regular, even and unlabored    Abdomen:     Soft nontender, nondistended, normal bowel sounds   Extremities:   No gross deformities, no edema, clubbing, or cyanosis.      Barbaeu Test:  Left: Not assessed  (oxymetric Allens) Right: Normal    Labs and Diagnostic Data:  Results from last 7 days   Lab Units 11/28/22  0643   SODIUM mmol/L 138   POTASSIUM mmol/L 4.5   CHLORIDE mmol/L 107   CO2 mmol/L 19.0*   BUN mg/dL 32*   CREATININE mg/dL 1.22   GLUCOSE mg/dL 109*   CALCIUM mg/dL 10.0     Results from last 7 days   Lab Units 11/28/22  0643   WBC 10*3/mm3 5.73   HEMOGLOBIN g/dL 15.6   HEMATOCRIT % 46.2   PLATELETS 10*3/mm3 165     Lab Results   Component Value Date    CHOL 219 (H) 11/28/2022    TRIG 282 (H) 11/28/2022    HDL 44 11/28/2022     (H) 11/28/2022    AST 26 11/28/2022    ALT 31 11/28/2022                 Tele: SR    IMPRESSION:  · 57 y/o male with CAD, HTN, HLD, DM2 and syncope with progressive class III angina. Cardiac cath +/- CBI recommended.     PLAN:  · Procedure to perform: LHC +/- CBI. Risks, benefits and alternatives to the procedure explained to the patient and he understands and wishes to proceed.       Electronically signed by " Norma Alcala PA-C, 11/28/22, 7:34 AM EST.

## 2022-11-28 NOTE — Clinical Note
Allergies reviewed.  H&P note has been confirmed for the patient. Procedural consent has been signed.  Staff has reviewed the patient's labs.  Labs have been reviewed and show abnormalities. Physician is aware of labs.

## 2022-11-28 NOTE — PROGRESS NOTES
Cardiac Rehab staff mailed referral letter to patient regarding Phase II Cardiac Rehab program. Instruction for patient to contact [facility] Cardiac Rehab Department for additional program information and to forward referral to closest Cardiac Rehab program.

## 2022-11-29 ENCOUNTER — CALL CENTER PROGRAMS (OUTPATIENT)
Dept: CALL CENTER | Facility: HOSPITAL | Age: 57
End: 2022-11-29

## 2022-11-29 LAB — ACT BLD: 260 SECONDS (ref 82–152)

## 2022-11-29 NOTE — OUTREACH NOTE
PCI/Device Survey    Flowsheet Row Responses   Facility patient discharged from? [facility]   Procedure date 11/28/22   Procedure (if device, specify in description) PCI   PCI site Right, Arm   Performing MD Dr. aCsey Milton   Attempt successful? Yes   Call start time 0934   Rescheduled Rescheduled-pt requested  [wife requests we speak to pt, he has not answered the phone when i called him]   Call end time 0935          JEMIMA BERGER - Registered Nurse

## 2022-11-29 NOTE — OUTREACH NOTE
PCI/Device Survey    Flowsheet Row Responses   Facility patient discharged from? [place]   Procedure date 11/28/22   Procedure (if device, specify in description) PCI   PCI site Right, Arm   Performing MD Dr. Casey Milton   Attempt successful? Yes   Call start time 1213   Call end time 1216   Nursing interventions Patient education provided   Is the patient taking prescribed medications: ASA, Plavix   Nursing intervention Reminded to continue to take prescribed medications, Nurse provided patient education   Does the patient have any of the following symptoms related to the cath/surgical site? --  [right radial, small amt of bruising 1/2in around dressing.]   Nursing intervention Patient education provided   Does the patient have an appointment scheduled with the cardiologist? Yes   Appointment comments 1/10/23   If the patient is a current smoker, are they able to teach back resources for cessation? Not a smoker   Did the patient feel prepared to go home on the same day as the procedure? Yes   Is the patient satisfied with the same day discharge process? Yes   PCI/Device call completed Yes          JEMIMA BERGER - Registered Nurse

## 2022-12-14 ENCOUNTER — TELEPHONE (OUTPATIENT)
Dept: CARDIOLOGY | Facility: CLINIC | Age: 57
End: 2022-12-14

## 2022-12-14 DIAGNOSIS — I47.20 VENTRICULAR TACHYCARDIA: Primary | ICD-10-CM

## 2022-12-14 DIAGNOSIS — R55 SYNCOPE, UNSPECIFIED SYNCOPE TYPE: ICD-10-CM

## 2022-12-14 NOTE — TELEPHONE ENCOUNTER
Patient declines the CT at this time. He said he just had one with Dr. Dash and it was ok. He will call back if he changes his mind. He will call PCP to follow up regarding additional testing and labs.

## 2022-12-14 NOTE — TELEPHONE ENCOUNTER
I have no concerns about his coronary artery disease following his recent heart catheterization with stent.    If there is no arrhythmia documented on mobile cardiac outpatient telemetry monitor, then I am perplexed as to why he is feeling poorly.    I advised that he see his PCP and get blood work including CBC, CMP.  I also recommend he get checked for COVID and influenza.  Also, let us order a CT chest with contrast to evaluate pulmonary embolus.    KASSANDRA Milton MD [facility], [facility]  Interventional and General Cardiology

## 2022-12-14 NOTE — TELEPHONE ENCOUNTER
"Patient called to report that he is still having episodes of, \"almost passing out\". 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 Today while vacuuming, his HR went up to 150, he becomes short of breath and lightheaded.    He saw Dr. Dash yesterday and reports his O2 sat was 78 when getting in to the office. She told him his pulmonary status had not changed but did tell him to wear his O2 continuously and not just at night.     He says his HR on minimal exertion is 120-155. He has to stop and rest. He is surprised we have not received any alerts. He is still wearing his MCOT. I checked with the holter department and they still do not have any alerts that have come through. He also has a loop recorder.    He says since his LHC 11/28/2022, he feels worse than he did. He is adamant something is wrong.     You had mentioned referral to EP if no ischemia on LHC but he did get a KATINA to proximal/mid LAD.       Did you want any additional testing? Echo?    "

## 2023-01-06 ENCOUNTER — HOSPITAL ENCOUNTER (OUTPATIENT)
Dept: CARDIOLOGY | Facility: HOSPITAL | Age: 58
Discharge: HOME | End: 2023-01-06
Admitting: INTERNAL MEDICINE
Payer: MEDICARE

## 2023-01-06 VITALS — BODY MASS INDEX: 27.72 KG/M2 | WEIGHT: 198 LBS | HEIGHT: 71 IN

## 2023-01-06 DIAGNOSIS — I47.20 VENTRICULAR TACHYCARDIA: ICD-10-CM

## 2023-01-06 DIAGNOSIS — R55 SYNCOPE, UNSPECIFIED SYNCOPE TYPE: ICD-10-CM

## 2023-01-06 PROCEDURE — 93306 TTE W/DOPPLER COMPLETE: CPT

## 2023-01-06 PROCEDURE — 93306 TTE W/DOPPLER COMPLETE: CPT | Performed by: INTERNAL MEDICINE

## 2023-01-06 NOTE — PROGRESS NOTES
Cardiology Outpatient Visit      Identification: Bret Rodriguez is a 57 y.o. male who resides in [place], [place]    Reason for visit:  Coronary artery disease involving native coronary artery of and Left Heart Cath w/ stent    Assessment     Problem List Items Addressed This Visit        Cardiac and Vasculature    Coronary artery disease involving native coronary artery of native heart with angina pectoris (HCC) - Primary (Chronic)    Overview     · Cardiac catherization (07/29/2009): Mild CAD. Preserved LVEF.   · Echocardiogram at [facility] (03/20/2014): LVEF 50-55%. Mild aortic valve sclerosis without stenosis.   · Cardiac catheterization by [facility] (2015):  Moderate disease.  · Cardiac catherization (11/09/2017): Noncritical CAD. Patient inferior wall fixed defect could be due to small vessel disease.   · CXR (09/17/2020): No acute disease.   · Echocardiogram (09/14/2017): EF 55-60%.  No significant valvular abnormality  · Nuclear stress test (09/18/2020): Fixed inferior wall defect probably diaphragmatic attenuation or inferior infarct. No ischemia seen. LVEF 45%.  · Echocardiogram (09/18/2020): EF 60%.  No significant valve abnormality  · Cardiac catheterization (9/28/2020): Moderate 1-vessel CAD (mid LAD/D1 bifurcation).  Normal LV filling pressure  · Cardiac catheterization (11/28/2022): Hemodynamically significant 1-vessel CAD involving the proximal/mid LAD status post KATINA (Xience 4 x 38 mm)         Current Assessment & Plan     · No chest pain post PCI  · Continue dual antiplatelet therapy with aspirin and Plavix  · Continue Toprol tartrate 100 mg twice daily  · Sublingual nitroglycerin as needed for episodes of angina         NSVT (nonsustained ventricular tachycardia)    Overview     · Episodes of tachycardia on loop recorder difficult to discern between narrow and wide complex tachycardia  · Episode of syncope in the last year concerning for cardiac etiology  · 30-day monitor showed brief  NSVT no more than 4 beats         Current Assessment & Plan     · Continue metoprolol tartrate 100 mg twice daily  · No need for referral to EP at present time         Essential hypertension (Chronic)    Overview     · Severe hypertension since early adulthood  • Target blood pressure <130/80 mmHg  • Renal duplex (9/18/2020): No evidence of renal artery stenosis         Current Assessment & Plan     · BP is controlled  · Continue metoprolol tartrate 100 mg twice daily  · Continue amlodipine 10 mg daily  · Continue Dralzine 25 mg twice daily  · Continue lisinopril 40 mg daily  · Continue spironolactone 50 mg twice daily  · Clonidine 0.2 mg 3 times daily as needed for SBP greater than 180 mmHg         Hyperlipidemia LDL goal <70 (Chronic)    Overview     • High intensity statin therapy indicated given the presence of CAD  · Intolerant to atorvastatin due to myalgias         Current Assessment & Plan     · Continue Crestor 40 mg daily         Encounter for loop recorder check    Overview     · Loop recorder implant on 9/28/2020         Current Assessment & Plan     · 1 brief episode of atrial tachycardia        Patient continues to have shortness of breath and decreased O2 saturations with ambulation and now on oxygen.  PCI did not improve his symptoms and he is actually continued to decline.  Echocardiogram does not suggest pulmonary hypertension or CHF his monitor showed just occasional PACs and PVCs with some sinus tachycardia, and brief atrial tachycardia.  He had minimal episodes of NSVT with the longest of 4 beats.  However the complete summary of the report from his monitor is not available and we will review that once it is completed.  I do not think he needs referral to EP.  I believe his pulmonary fibrosis has worsened and his underlying issues are pulmonary.    Plan   • Continue current medications  • Follow-up with pulmonology for worsening pulmonary fibrosis  • We will review final 30-day monitor report  once available but based on current report does not need EP referral.  Fairly benign monitor.  Tachycardia episodes due to underlying pulmonary disease  • Will contact Dr. Dash's PA/NP to discuss patient's case      Follow-up   Return in about 6 months (around 7/10/2023), or if symptoms worsen or fail to improve, for Follow-up with Dr. Milton next visit.        Subjective      Patient is a 57-year-old gentleman who returns today for follow-up of his coronary artery disease, loop recorder interrogation and cardiac risk factors.  At his last visit in October the patient reported a continued decline in his shortness of breath and chest tightness.  He also reported lightheadedness and a syncopal episode.  He has since at the end of November underwent a cardiac catheterization receiving PCI to the proximal/mid LAD.  A 30-day monitor was also placed as it was difficult to determine between narrow and wide complex tachycardia on loop recorder interrogation.  The patient has had no improvement in his breathing since PCI.  He has continued to decline.  He has known pulmonary fibrosis and follows Dr. Dash in [place].  He is now requiring oxygen because his O2 saturations on ambulation will decrease into the mid 80s.  He is also noticed during some of these times his heart rate will increase to 120 bpm just walking across the room.  His loop recorder showed 1 short episode of atrial tachycardia but no atrial fibrillation.  His 30-day monitor showed occasional PVCs and PACs and symptoms of shortness of breath and lightheadedness would correspond to sinus rhythm and sinus rhythm with PVCs and PACs.  He did have some brief atrial tachycardia and brief NSVT of no more than 4 beats.  The patient has not had a recent CT since October.  He is wondering if he could have pulmonary hypertension but his echocardiogram showed normal LVEF, no valvular abnormality and no elevated right ventricular systolic pressures.  He did have  grade 1 diastolic dysfunction.  He reports pulmonology has historically said his issues is the heart.    Review of Systems   Constitutional: Negative for malaise/fatigue.   Eyes: Negative for vision loss in left eye and vision loss in right eye.   Cardiovascular: Positive for dyspnea on exertion and irregular heartbeat. Negative for chest pain, near-syncope, orthopnea, palpitations, paroxysmal nocturnal dyspnea and syncope.   Respiratory: Positive for shortness of breath.    Musculoskeletal: Negative for myalgias.   Neurological: Positive for dizziness and light-headedness. Negative for brief paralysis, excessive daytime sleepiness, focal weakness, numbness, paresthesias and weakness.   All other systems reviewed and are negative.      Current Outpatient Medications   Medication Instructions   • albuterol (PROVENTIL) 2.5 mg, Nebulization, Every 4 Hours PRN   • amLODIPine (NORVASC) 10 mg, Oral, Daily   • Asmanex, 30 Metered Doses, 220 MCG/ACT inhaler As Needed   • aspirin 81 mg, Oral, Daily   • cloNIDine (CATAPRES) 0.2 mg, Oral, 3 Times Daily PRN   • clopidogrel (PLAVIX) 75 mg, Oral, Daily   • Diclofenac Sodium (VOLTAREN) 1 % gel gel As Needed   • empagliflozin (JARDIANCE) 10 mg, Oral, Daily   • ergocalciferol (ERGOCALCIFEROL) 1.25 MG (30640 UT) capsule Weekly   • Etanercept (Enbrel SureClick) 50 MG/ML solution auto-injector Weekly   • fenofibrate 160 mg, Oral, Daily   • finasteride (PROSCAR) 5 mg, Oral, Daily   • FLUoxetine (PROZAC) 20 mg, Oral, 3 Times Daily   • gabapentin (NEURONTIN) 600 mg, Oral, As Needed   • hydrALAZINE (APRESOLINE) 25 mg, Oral, Every 12 Hours Scheduled   • HYDROcodone-acetaminophen (NORCO)  MG per tablet 1 tablet, Oral, As Needed   • hydroxychloroquine (PLAQUENIL) 200 mg, Oral, Daily   • leflunomide (ARAVA) 20 mg, Oral, Daily   • Levemir FlexTouch 20 Units, Subcutaneous, Daily   • lisinopril (PRINIVIL,ZESTRIL) 40 mg, Oral, Daily   • LORazepam (ATIVAN) 0.5 mg, Oral, 2 Times Daily   •  Methotrexate Sodium syringe Weekly   • metoprolol tartrate (LOPRESSOR) 100 mg, Oral, 2 Times Daily   • mupirocin (BACTROBAN) 2 % ointment As Needed   • nitroglycerin (NITROSTAT) 0.4 mg, Sublingual, Every 5 Minutes PRN   • Ozempic (0.25 or 0.5 MG/DOSE) 0.25 mg, Subcutaneous, Weekly   • predniSONE (DELTASONE) 20 mg, Oral, As Needed   • rosuvastatin (CRESTOR) 40 mg, Oral, Nightly   • sildenafil (VIAGRA) 100 mg, Oral, Daily PRN   • spironolactone (ALDACTONE) 50 mg, Oral, 2 Times Daily   • tamsulosin (FLOMAX) 0.4 MG capsule 24 hr capsule 1 capsule, Oral, Daily   • traMADol (ULTRAM) 25 mg, Oral, As Needed   • traZODone (DESYREL) 50 mg, Oral, Nightly   • vitamin B-12 (CYANOCOBALAMIN) 1,000 mcg, Oral, Daily       Objective     /68 (BP Location: Left arm, Patient Position: Sitting)   Pulse 92   Ht 180.3 cm (71\")   Wt 93.4 kg (206 lb)   SpO2 96%   BMI 28.73 kg/m²       Constitutional:       Appearance: Healthy appearance. Well-developed.   Eyes:      General: Lids are normal. No scleral icterus.     Conjunctiva/sclera: Conjunctivae normal.   HENT:      Head: Normocephalic and atraumatic.   Neck:      Thyroid: No thyromegaly.      Vascular: No carotid bruit or JVD.   Pulmonary:      Effort: Pulmonary effort is normal.      Breath sounds: Normal breath sounds. No wheezing. No rhonchi. No rales.   Cardiovascular:      Normal rate. Regular rhythm.      Murmurs: There is no murmur.      No gallop. No rub.   Pulses:     Intact distal pulses.   Edema:     Peripheral edema absent.   Abdominal:      General: There is no distension.      Palpations: Abdomen is soft. There is no abdominal mass.   Musculoskeletal:      Cervical back: Normal range of motion. Skin:     General: Skin is warm and dry.      Findings: No rash.   Neurological:      General: No focal deficit present.      Mental Status: Alert and oriented to person, place, and time.      Gait: Gait is intact.   Psychiatric:         Attention and Perception: Attention  normal.         Mood and Affect: Mood normal.         Behavior: Behavior normal.         Result Review  (reviewed with patient):    Loop recorder 1 tacky episodes since last report at 19 seconds on 12/18/2022    ECG 12 Lead    Date/Time: 1/10/2023 12:51 PM  Performed by: Christina Hutchison APRN  Authorized by: Christina Hutchison APRN   Comparison: compared with previous ECG from 9/9/2020  Similar to previous ECG  Rhythm: sinus rhythm  BPM: 92    Clinical impression: normal ECG  Comments: QT/QTc 336/415 ms             Lab Results   Component Value Date    GLUCOSE 109 (H) 11/28/2022    BUN 32 (H) 11/28/2022    CREATININE 1.22 11/28/2022    EGFRIFNONA 46 (L) 09/28/2020    BCR 26.2 (H) 11/28/2022    K 4.5 11/28/2022    CO2 19.0 (L) 11/28/2022    CALCIUM 10.0 11/28/2022    ALBUMIN 3.90 11/28/2022    AST 26 11/28/2022    ALT 31 11/28/2022     Lab Results   Component Value Date    WBC 5.73 11/28/2022    HGB 15.6 11/28/2022    HCT 46.2 11/28/2022    MCV 91.1 11/28/2022     11/28/2022     Lab Results   Component Value Date    CHOL 219 (H) 11/28/2022    TRIG 282 (H) 11/28/2022    HDL 44 11/28/2022     (H) 11/28/2022     Lab Results   Component Value Date    HGBA1C 5.80 (H) 11/28/2022         ARLINE Wade  1/10/2023

## 2023-01-09 LAB
ASCENDING AORTA: 3.8 CM
BH CV ECHO MEAS - AO MAX PG: 5.8 MMHG
BH CV ECHO MEAS - AO MEAN PG: 3 MMHG
BH CV ECHO MEAS - AO ROOT DIAM: 3.3 CM
BH CV ECHO MEAS - AO V2 MAX: 120 CM/SEC
BH CV ECHO MEAS - AO V2 VTI: 20.6 CM
BH CV ECHO MEAS - AVA(I,D): 2.4 CM2
BH CV ECHO MEAS - EDV(CUBED): 64 ML
BH CV ECHO MEAS - EDV(MOD-SP2): 117 ML
BH CV ECHO MEAS - EDV(MOD-SP4): 129 ML
BH CV ECHO MEAS - EF(MOD-BP): 48 %
BH CV ECHO MEAS - EF(MOD-SP2): 49.6 %
BH CV ECHO MEAS - EF(MOD-SP4): 44.2 %
BH CV ECHO MEAS - ESV(CUBED): 19.7 ML
BH CV ECHO MEAS - ESV(MOD-SP2): 59 ML
BH CV ECHO MEAS - ESV(MOD-SP4): 72 ML
BH CV ECHO MEAS - FS: 32.5 %
BH CV ECHO MEAS - IVS/LVPW: 0.92 CM
BH CV ECHO MEAS - IVSD: 1.07 CM
BH CV ECHO MEAS - LA DIMENSION: 3.5 CM
BH CV ECHO MEAS - LAT PEAK E' VEL: 9.6 CM/SEC
BH CV ECHO MEAS - LV DIASTOLIC VOL/BSA (35-75): 61.4 CM2
BH CV ECHO MEAS - LV MASS(C)D: 148.5 GRAMS
BH CV ECHO MEAS - LV MAX PG: 2.6 MMHG
BH CV ECHO MEAS - LV MEAN PG: 1 MMHG
BH CV ECHO MEAS - LV SYSTOLIC VOL/BSA (12-30): 34.3 CM2
BH CV ECHO MEAS - LV V1 MAX: 80.9 CM/SEC
BH CV ECHO MEAS - LV V1 VTI: 13 CM
BH CV ECHO MEAS - LVIDD: 4 CM
BH CV ECHO MEAS - LVIDS: 2.7 CM
BH CV ECHO MEAS - LVOT AREA: 3.8 CM2
BH CV ECHO MEAS - LVOT DIAM: 2.2 CM
BH CV ECHO MEAS - LVPWD: 1.16 CM
BH CV ECHO MEAS - MED PEAK E' VEL: 7.33 CM/SEC
BH CV ECHO MEAS - MV A MAX VEL: 63.8 CM/SEC
BH CV ECHO MEAS - MV DEC SLOPE: 252 CM/SEC2
BH CV ECHO MEAS - MV DEC TIME: 0.21 MSEC
BH CV ECHO MEAS - MV E MAX VEL: 51.4 CM/SEC
BH CV ECHO MEAS - MV E/A: 0.81
BH CV ECHO MEAS - MV MAX PG: 1.73 MMHG
BH CV ECHO MEAS - MV MEAN PG: 1 MMHG
BH CV ECHO MEAS - MV P1/2T: 59.5 MSEC
BH CV ECHO MEAS - MV V2 VTI: 14.7 CM
BH CV ECHO MEAS - MVA(P1/2T): 3.7 CM2
BH CV ECHO MEAS - MVA(VTI): 3.4 CM2
BH CV ECHO MEAS - PA ACC SLOPE: 539 CM/SEC2
BH CV ECHO MEAS - PA ACC TIME: 0.11 SEC
BH CV ECHO MEAS - PA PR(ACCEL): 28.2 MMHG
BH CV ECHO MEAS - PA V2 MAX: 95 CM/SEC
BH CV ECHO MEAS - RAP SYSTOLE: 3 MMHG
BH CV ECHO MEAS - RVSP: 13.2 MMHG
BH CV ECHO MEAS - SI(MOD-SP2): 27.6 ML/M2
BH CV ECHO MEAS - SI(MOD-SP4): 27.1 ML/M2
BH CV ECHO MEAS - SV(LVOT): 49.4 ML
BH CV ECHO MEAS - SV(MOD-SP2): 58 ML
BH CV ECHO MEAS - SV(MOD-SP4): 57 ML
BH CV ECHO MEAS - TAPSE (>1.6): 2.4 CM
BH CV ECHO MEAS - TR MAX PG: 10.2 MMHG
BH CV ECHO MEAS - TR MAX VEL: 160 CM/SEC
BH CV ECHO MEASUREMENTS AVERAGE E/E' RATIO: 6.07
BH CV VAS BP LEFT ARM: NORMAL MMHG
BH CV XLRA - RV BASE: 3.2 CM
BH CV XLRA - RV LENGTH: 6.5 CM
BH CV XLRA - RV MID: 2.7 CM
BH CV XLRA - TDI S': 10.2 CM/SEC
IVRT: 87 MSEC
LEFT ATRIUM VOLUME INDEX: 15.7 ML/M2
LV EF 2D ECHO EST: 55 %
MAXIMAL PREDICTED HEART RATE: 164 BPM
STRESS TARGET HR: 139 BPM

## 2023-01-10 ENCOUNTER — OFFICE VISIT (OUTPATIENT)
Dept: CARDIOLOGY | Facility: CLINIC | Age: 58
End: 2023-01-10
Payer: MEDICARE

## 2023-01-10 VITALS
OXYGEN SATURATION: 96 % | SYSTOLIC BLOOD PRESSURE: 118 MMHG | HEIGHT: 71 IN | HEART RATE: 92 BPM | WEIGHT: 206 LBS | DIASTOLIC BLOOD PRESSURE: 68 MMHG | BODY MASS INDEX: 28.84 KG/M2

## 2023-01-10 DIAGNOSIS — I10 ESSENTIAL HYPERTENSION: Chronic | ICD-10-CM

## 2023-01-10 DIAGNOSIS — I47.29 NSVT (NONSUSTAINED VENTRICULAR TACHYCARDIA): ICD-10-CM

## 2023-01-10 DIAGNOSIS — Z45.09 ENCOUNTER FOR LOOP RECORDER CHECK: ICD-10-CM

## 2023-01-10 DIAGNOSIS — I25.119 CORONARY ARTERY DISEASE INVOLVING NATIVE CORONARY ARTERY OF NATIVE HEART WITH ANGINA PECTORIS: Primary | Chronic | ICD-10-CM

## 2023-01-10 DIAGNOSIS — E78.5 HYPERLIPIDEMIA LDL GOAL <70: Chronic | ICD-10-CM

## 2023-01-10 PROCEDURE — 99214 OFFICE O/P EST MOD 30 MIN: CPT | Performed by: NURSE PRACTITIONER

## 2023-01-10 PROCEDURE — 93000 ELECTROCARDIOGRAM COMPLETE: CPT | Performed by: NURSE PRACTITIONER

## 2023-01-10 NOTE — ASSESSMENT & PLAN NOTE
· No chest pain post PCI  · Continue dual antiplatelet therapy with aspirin and Plavix  · Continue Toprol tartrate 100 mg twice daily  · Sublingual nitroglycerin as needed for episodes of angina

## 2023-01-10 NOTE — ASSESSMENT & PLAN NOTE
· BP is controlled  · Continue metoprolol tartrate 100 mg twice daily  · Continue amlodipine 10 mg daily  · Continue Dralzine 25 mg twice daily  · Continue lisinopril 40 mg daily  · Continue spironolactone 50 mg twice daily  · Clonidine 0.2 mg 3 times daily as needed for SBP greater than 180 mmHg

## 2023-01-13 ENCOUNTER — TELEPHONE (OUTPATIENT)
Dept: CARDIOLOGY | Facility: CLINIC | Age: 58
End: 2023-01-13
Payer: MEDICARE

## 2023-01-13 NOTE — TELEPHONE ENCOUNTER
Patient called to ask if we heard any information from Dr. Dash's office. Elvi is still waiting for a call back. I told him that if there was anything additonal, we would let him know. He verbalized understanding.

## 2023-02-10 DIAGNOSIS — E11.9 TYPE 2 DIABETES MELLITUS WITHOUT COMPLICATION, WITH LONG-TERM CURRENT USE OF INSULIN: ICD-10-CM

## 2023-02-10 DIAGNOSIS — I25.119 CORONARY ARTERY DISEASE INVOLVING NATIVE CORONARY ARTERY OF NATIVE HEART WITH ANGINA PECTORIS: Chronic | ICD-10-CM

## 2023-02-10 DIAGNOSIS — Z79.4 TYPE 2 DIABETES MELLITUS WITHOUT COMPLICATION, WITH LONG-TERM CURRENT USE OF INSULIN: ICD-10-CM

## 2023-02-10 DIAGNOSIS — I10 ESSENTIAL HYPERTENSION: ICD-10-CM

## 2023-02-10 RX ORDER — AMLODIPINE BESYLATE 10 MG/1
10 TABLET ORAL DAILY
Qty: 90 TABLET | Refills: 3 | Status: SHIPPED | OUTPATIENT
Start: 2023-02-10 | End: 2023-02-13 | Stop reason: SDUPTHER

## 2023-02-10 RX ORDER — METOPROLOL TARTRATE 100 MG/1
100 TABLET ORAL 2 TIMES DAILY
Qty: 180 TABLET | Refills: 3 | Status: SHIPPED | OUTPATIENT
Start: 2023-02-10

## 2023-02-10 RX ORDER — SPIRONOLACTONE 50 MG/1
50 TABLET, FILM COATED ORAL 2 TIMES DAILY
Qty: 90 TABLET | Refills: 3 | Status: SHIPPED | OUTPATIENT
Start: 2023-02-10

## 2023-02-10 RX ORDER — LISINOPRIL 40 MG/1
40 TABLET ORAL DAILY
Qty: 90 TABLET | Refills: 3 | Status: SHIPPED | OUTPATIENT
Start: 2023-02-10

## 2023-02-10 RX ORDER — CLOPIDOGREL BISULFATE 75 MG/1
75 TABLET ORAL DAILY
Qty: 90 TABLET | Refills: 3 | Status: SHIPPED | OUTPATIENT
Start: 2023-02-10 | End: 2023-05-11

## 2023-02-10 NOTE — TELEPHONE ENCOUNTER
Lab Results   Component Value Date    GLUCOSE 109 (H) 11/28/2022    BUN 32 (H) 11/28/2022    CREATININE 1.22 11/28/2022    EGFR 70.0 11/28/2022    BCR 26.2 (H) 11/28/2022    K 4.5 11/28/2022    CO2 19.0 (L) 11/28/2022    CALCIUM 10.0 11/28/2022    ALBUMIN 3.90 11/28/2022    AST 26 11/28/2022    ALT 31 11/28/2022

## 2023-02-13 DIAGNOSIS — I10 ESSENTIAL HYPERTENSION: ICD-10-CM

## 2023-02-13 RX ORDER — CLONIDINE HYDROCHLORIDE 0.2 MG/1
0.2 TABLET ORAL 3 TIMES DAILY PRN
Qty: 90 TABLET | Refills: 3 | Status: SHIPPED | OUTPATIENT
Start: 2023-02-13

## 2023-02-13 RX ORDER — AMLODIPINE BESYLATE 10 MG/1
10 TABLET ORAL DAILY
Qty: 90 TABLET | Refills: 3 | Status: SHIPPED | OUTPATIENT
Start: 2023-02-13

## 2023-03-26 PROCEDURE — 93298 REM INTERROG DEV EVAL SCRMS: CPT | Performed by: INTERNAL MEDICINE

## 2023-03-26 PROCEDURE — G2066 INTER DEVC REMOTE 30D: HCPCS | Performed by: INTERNAL MEDICINE

## 2023-11-06 ENCOUNTER — TELEPHONE (OUTPATIENT)
Dept: CARDIOLOGY | Facility: CLINIC | Age: 58
End: 2023-11-06
Payer: MEDICARE

## 2023-11-06 NOTE — TELEPHONE ENCOUNTER
Name: SARINA ACUNA    Relationship: Emergency Contact    Best Callback Number: [phone]    Incoming call to the Hub, requesting to  Reschedule their Device Check appointment on 11/08/23.     Per Hub workflow, further review is needed before the task can be completed.    Result of Call: Transferred to [facility]  at the practice

## 2023-11-09 ENCOUNTER — TELEPHONE (OUTPATIENT)
Dept: CARDIOLOGY | Facility: CLINIC | Age: 58
End: 2023-11-09
Payer: MEDICARE

## 2023-11-09 NOTE — TELEPHONE ENCOUNTER
I spoke with  regarding his medtronic monitor not transmitting. He was not home and stated he would call me when he arrives home to trouble shoot his monitor.

## 2023-11-15 PROBLEM — R55 SYNCOPE: Status: RESOLVED | Noted: 2020-09-23 | Resolved: 2023-01-01

## 2023-11-15 PROBLEM — N18.30 STAGE 3 CHRONIC KIDNEY DISEASE: Status: ACTIVE | Noted: 2020-09-22

## 2023-12-29 ENCOUNTER — TELEPHONE (OUTPATIENT)
Dept: CARDIOLOGY | Facility: CLINIC | Age: 58
End: 2023-12-29

## 2023-12-29 NOTE — TELEPHONE ENCOUNTER
"  “Please be informed that patient has passed. Patient has been marked  in the system. The date of death is: 2023\".    Caller: SARINA ACUNA    Relationship: Emergency Contact    Best call back number: [phone]    Did the patient have surgery within 30 days of their passing (Y/N): NO  "

## (undated) DEVICE — COPILOT BLEEDBACK CONTROL VALVE: Brand: COPILOT

## (undated) DEVICE — DEBRIDEMENT KIT: Brand: MEDLINE INDUSTRIES, INC.

## (undated) DEVICE — CATH DIAG EXPO .056 FL3.5 6F 100CM

## (undated) DEVICE — Device: Brand: ASAHI SION BLUE

## (undated) DEVICE — GUIDE CATHETER: Brand: MACH1™

## (undated) DEVICE — MODEL AT P65, P/N 701554-001KIT CONTENTS: HAND CONTROLLER, 3-WAY HIGH-PRESSURE STOPCOCK WITH ROTATING END AND PREMIUM HIGH-PRESSURE TUBING: Brand: ANGIOTOUCH® KIT

## (undated) DEVICE — PK CATH CARD 10

## (undated) DEVICE — GLIDESHEATH BASIC HYDROPHILIC COATED INTRODUCER SHEATH: Brand: GLIDESHEATH

## (undated) DEVICE — GW PRESSUREWIRE X WIRELESS FFR 175CM

## (undated) DEVICE — SKIN PREP TRAY W/CHG: Brand: MEDLINE INDUSTRIES, INC.

## (undated) DEVICE — GW PERIPH GUIDERIGHT STD/EXCHNG/J/TIP SS 0.035IN 5X260CM

## (undated) DEVICE — GLIDESHEATH SLENDER STAINLESS STEEL KIT: Brand: GLIDESHEATH SLENDER

## (undated) DEVICE — MODEL BT2000 P/N 700287-012KIT CONTENTS: MANIFOLD WITH SALINE AND CONTRAST PORTS, SALINE TUBING WITH SPIKE AND HAND SYRINGE, TRANSDUCER: Brand: BT2000 AUTOMATED MANIFOLD KIT

## (undated) DEVICE — DEV COMP RAD PRELUDESYNC 24CM

## (undated) DEVICE — KT VLV HEMO MAP ACC PLS LG/BORE MTL/INTRO W/TORQ/DEV

## (undated) DEVICE — CATH DIAG EXPO M/ PK 6FR FL4/FR4 PIG 3PK

## (undated) DEVICE — Device

## (undated) DEVICE — NDL ANGIOGR ADV THN SMOTH SGLWALL 21G 1.5

## (undated) DEVICE — KT CATH IMG DRAGONFLY/OPSTAR 2.7F 135CM

## (undated) DEVICE — ADULT, W/LG. BACK PAD, RADIOTRANSPARENT ELEMENT AND LEAD WIRE: Brand: DEFIBRILLATION ELECTRODES

## (undated) DEVICE — NC TREK NEO™ CORONARY DILATATION CATHETER 4.00 MM X 20 MM / RAPID-EXCHANGE: Brand: NC TREK NEO™

## (undated) DEVICE — CVR PROB ULTRASND/TRANSD W/GEL 7X11IN STRL